# Patient Record
Sex: FEMALE | Race: ASIAN | NOT HISPANIC OR LATINO
[De-identification: names, ages, dates, MRNs, and addresses within clinical notes are randomized per-mention and may not be internally consistent; named-entity substitution may affect disease eponyms.]

---

## 2017-10-16 ENCOUNTER — RESULT REVIEW (OUTPATIENT)
Age: 32
End: 2017-10-16

## 2017-11-09 ENCOUNTER — RECORD ABSTRACTING (OUTPATIENT)
Age: 32
End: 2017-11-09

## 2017-11-09 DIAGNOSIS — Z80.3 FAMILY HISTORY OF MALIGNANT NEOPLASM OF BREAST: ICD-10-CM

## 2017-11-09 DIAGNOSIS — Z82.49 FAMILY HISTORY OF ISCHEMIC HEART DISEASE AND OTHER DISEASES OF THE CIRCULATORY SYSTEM: ICD-10-CM

## 2017-11-09 DIAGNOSIS — Z83.3 FAMILY HISTORY OF DIABETES MELLITUS: ICD-10-CM

## 2017-11-09 DIAGNOSIS — O09.90 SUPERVISION OF HIGH RISK PREGNANCY, UNSPECIFIED, UNSPECIFIED TRIMESTER: ICD-10-CM

## 2017-11-09 PROBLEM — Z00.00 ENCOUNTER FOR PREVENTIVE HEALTH EXAMINATION: Status: ACTIVE | Noted: 2017-11-09

## 2017-11-14 ENCOUNTER — ASOB RESULT (OUTPATIENT)
Age: 32
End: 2017-11-14

## 2017-11-14 ENCOUNTER — APPOINTMENT (OUTPATIENT)
Dept: ANTEPARTUM | Facility: CLINIC | Age: 32
End: 2017-11-14
Payer: COMMERCIAL

## 2017-11-14 ENCOUNTER — APPOINTMENT (OUTPATIENT)
Dept: MATERNAL FETAL MEDICINE | Facility: CLINIC | Age: 32
End: 2017-11-14
Payer: COMMERCIAL

## 2017-11-14 VITALS
SYSTOLIC BLOOD PRESSURE: 160 MMHG | DIASTOLIC BLOOD PRESSURE: 90 MMHG | HEIGHT: 68 IN | BODY MASS INDEX: 37.44 KG/M2 | WEIGHT: 247 LBS

## 2017-11-14 PROCEDURE — 97802 MEDICAL NUTRITION INDIV IN: CPT

## 2017-11-14 PROCEDURE — 76813 OB US NUCHAL MEAS 1 GEST: CPT

## 2017-11-14 PROCEDURE — 99243 OFF/OP CNSLTJ NEW/EST LOW 30: CPT | Mod: 25

## 2017-11-14 PROCEDURE — 76801 OB US < 14 WKS SINGLE FETUS: CPT

## 2017-11-14 PROCEDURE — 93975 VASCULAR STUDY: CPT

## 2017-11-14 RX ORDER — LABETALOL HYDROCHLORIDE 100 MG/1
100 TABLET, FILM COATED ORAL
Qty: 60 | Refills: 4 | Status: ACTIVE | COMMUNITY
Start: 2017-11-14 | End: 1900-01-01

## 2017-11-15 LAB
BILIRUB UR QL STRIP: NEGATIVE
COLLECTION METHOD: NORMAL
GLUCOSE UR-MCNC: NEGATIVE
HCG UR QL: 0.2 EU/DL
HGB UR QL STRIP.AUTO: NORMAL
KETONES UR-MCNC: NORMAL
LEUKOCYTE ESTERASE UR QL STRIP: NEGATIVE
NITRITE UR QL STRIP: NEGATIVE
PH UR STRIP: 6
PROT UR STRIP-MCNC: NEGATIVE
SP GR UR STRIP: 1.02

## 2017-11-20 LAB
ALBUMIN SERPL ELPH-MCNC: 3.7 G/DL
ALP BLD-CCNC: 60 U/L
ALT SERPL-CCNC: 19 U/L
ANION GAP SERPL CALC-SCNC: 15 MMOL/L
AST SERPL-CCNC: 14 U/L
BASOPHILS # BLD AUTO: 0.02 K/UL
BASOPHILS NFR BLD AUTO: 0.1 %
BILIRUB SERPL-MCNC: 0.2 MG/DL
BUN SERPL-MCNC: 5 MG/DL
CALCIUM SERPL-MCNC: 9.1 MG/DL
CHLORIDE SERPL-SCNC: 101 MMOL/L
CO2 SERPL-SCNC: 19 MMOL/L
CREAT SERPL-MCNC: 0.63 MG/DL
CREAT SPEC-SCNC: 137 MG/DL
CREAT/PROT UR: 0.1 RATIO
EOSINOPHIL # BLD AUTO: 0.08 K/UL
EOSINOPHIL NFR BLD AUTO: 0.5 %
GLUCOSE 1H P 50 G GLC PO SERPL-MCNC: 152 MG/DL
GLUCOSE SERPL-MCNC: 143 MG/DL
HCT VFR BLD CALC: 37.8 %
HGB BLD-MCNC: 12.5 G/DL
IMM GRANULOCYTES NFR BLD AUTO: 0.3 %
LYMPHOCYTES # BLD AUTO: 2.34 K/UL
LYMPHOCYTES NFR BLD AUTO: 15.9 %
MAN DIFF?: NORMAL
MCHC RBC-ENTMCNC: 28.3 PG
MCHC RBC-ENTMCNC: 33.1 GM/DL
MCV RBC AUTO: 85.7 FL
MONOCYTES # BLD AUTO: 0.5 K/UL
MONOCYTES NFR BLD AUTO: 3.4 %
NEUTROPHILS # BLD AUTO: 11.77 K/UL
NEUTROPHILS NFR BLD AUTO: 79.8 %
PLATELET # BLD AUTO: 289 K/UL
POTASSIUM SERPL-SCNC: 3.8 MMOL/L
PROT SERPL-MCNC: 7 G/DL
PROT UR-MCNC: 13 MG/DL
RBC # BLD: 4.41 M/UL
RBC # FLD: 13.6 %
SODIUM SERPL-SCNC: 135 MMOL/L
WBC # FLD AUTO: 14.76 K/UL

## 2018-01-10 ENCOUNTER — ASOB RESULT (OUTPATIENT)
Age: 33
End: 2018-01-10

## 2018-01-10 ENCOUNTER — APPOINTMENT (OUTPATIENT)
Dept: ANTEPARTUM | Facility: CLINIC | Age: 33
End: 2018-01-10
Payer: COMMERCIAL

## 2018-01-10 PROCEDURE — 76811 OB US DETAILED SNGL FETUS: CPT

## 2018-01-11 ENCOUNTER — APPOINTMENT (OUTPATIENT)
Dept: ANTEPARTUM | Facility: CLINIC | Age: 33
End: 2018-01-11

## 2018-02-05 ENCOUNTER — OUTPATIENT (OUTPATIENT)
Dept: OUTPATIENT SERVICES | Facility: HOSPITAL | Age: 33
LOS: 1 days | End: 2018-02-05
Payer: COMMERCIAL

## 2018-02-05 DIAGNOSIS — Z3A.00 WEEKS OF GESTATION OF PREGNANCY NOT SPECIFIED: ICD-10-CM

## 2018-02-05 DIAGNOSIS — O26.899 OTHER SPECIFIED PREGNANCY RELATED CONDITIONS, UNSPECIFIED TRIMESTER: ICD-10-CM

## 2018-02-05 LAB
ALBUMIN SERPL ELPH-MCNC: 3.7 G/DL — SIGNIFICANT CHANGE UP (ref 3.3–5)
ALP SERPL-CCNC: 68 U/L — SIGNIFICANT CHANGE UP (ref 40–120)
ALT FLD-CCNC: 11 U/L RC — SIGNIFICANT CHANGE UP (ref 10–45)
ANION GAP SERPL CALC-SCNC: 17 MMOL/L — SIGNIFICANT CHANGE UP (ref 5–17)
APPEARANCE UR: CLEAR — SIGNIFICANT CHANGE UP
APTT BLD: 26.5 SEC — LOW (ref 27.5–37.4)
AST SERPL-CCNC: 11 U/L — SIGNIFICANT CHANGE UP (ref 10–40)
BASOPHILS # BLD AUTO: 0.1 K/UL — SIGNIFICANT CHANGE UP (ref 0–0.2)
BASOPHILS NFR BLD AUTO: 0.4 % — SIGNIFICANT CHANGE UP (ref 0–2)
BILIRUB SERPL-MCNC: 0.2 MG/DL — SIGNIFICANT CHANGE UP (ref 0.2–1.2)
BILIRUB UR-MCNC: NEGATIVE — SIGNIFICANT CHANGE UP
BUN SERPL-MCNC: 7 MG/DL — SIGNIFICANT CHANGE UP (ref 7–23)
CALCIUM SERPL-MCNC: 9.7 MG/DL — SIGNIFICANT CHANGE UP (ref 8.4–10.5)
CHLORIDE SERPL-SCNC: 99 MMOL/L — SIGNIFICANT CHANGE UP (ref 96–108)
CO2 SERPL-SCNC: 22 MMOL/L — SIGNIFICANT CHANGE UP (ref 22–31)
COLOR SPEC: SIGNIFICANT CHANGE UP
CREAT SERPL-MCNC: 0.54 MG/DL — SIGNIFICANT CHANGE UP (ref 0.5–1.3)
DIFF PNL FLD: ABNORMAL
EOSINOPHIL # BLD AUTO: 0 K/UL — SIGNIFICANT CHANGE UP (ref 0–0.5)
EOSINOPHIL NFR BLD AUTO: 0.2 % — SIGNIFICANT CHANGE UP (ref 0–6)
FIBRINOGEN PPP-MCNC: 669 MG/DL — HIGH (ref 310–510)
GLUCOSE SERPL-MCNC: 88 MG/DL — SIGNIFICANT CHANGE UP (ref 70–99)
GLUCOSE UR QL: NEGATIVE — SIGNIFICANT CHANGE UP
HCT VFR BLD CALC: 33 % — LOW (ref 34.5–45)
HGB BLD-MCNC: 11.6 G/DL — SIGNIFICANT CHANGE UP (ref 11.5–15.5)
INR BLD: 1.05 RATIO — SIGNIFICANT CHANGE UP (ref 0.88–1.16)
KETONES UR-MCNC: NEGATIVE — SIGNIFICANT CHANGE UP
LDH SERPL L TO P-CCNC: 134 U/L — SIGNIFICANT CHANGE UP (ref 50–242)
LEUKOCYTE ESTERASE UR-ACNC: NEGATIVE — SIGNIFICANT CHANGE UP
LYMPHOCYTES # BLD AUTO: 16.7 % — SIGNIFICANT CHANGE UP (ref 13–44)
LYMPHOCYTES # BLD AUTO: 2.7 K/UL — SIGNIFICANT CHANGE UP (ref 1–3.3)
MCHC RBC-ENTMCNC: 30.4 PG — SIGNIFICANT CHANGE UP (ref 27–34)
MCHC RBC-ENTMCNC: 35.2 GM/DL — SIGNIFICANT CHANGE UP (ref 32–36)
MCV RBC AUTO: 86.4 FL — SIGNIFICANT CHANGE UP (ref 80–100)
MONOCYTES # BLD AUTO: 0.9 K/UL — SIGNIFICANT CHANGE UP (ref 0–0.9)
MONOCYTES NFR BLD AUTO: 5.4 % — SIGNIFICANT CHANGE UP (ref 2–14)
NEUTROPHILS # BLD AUTO: 12.6 K/UL — HIGH (ref 1.8–7.4)
NEUTROPHILS NFR BLD AUTO: 77.3 % — HIGH (ref 43–77)
NITRITE UR-MCNC: NEGATIVE — SIGNIFICANT CHANGE UP
PH UR: 6.5 — SIGNIFICANT CHANGE UP (ref 5–8)
PLATELET # BLD AUTO: 269 K/UL — SIGNIFICANT CHANGE UP (ref 150–400)
POTASSIUM SERPL-MCNC: 3.6 MMOL/L — SIGNIFICANT CHANGE UP (ref 3.5–5.3)
POTASSIUM SERPL-SCNC: 3.6 MMOL/L — SIGNIFICANT CHANGE UP (ref 3.5–5.3)
PROT SERPL-MCNC: 7.3 G/DL — SIGNIFICANT CHANGE UP (ref 6–8.3)
PROT UR-MCNC: NEGATIVE — SIGNIFICANT CHANGE UP
PROTHROM AB SERPL-ACNC: 11.4 SEC — SIGNIFICANT CHANGE UP (ref 9.8–12.7)
RBC # BLD: 3.82 M/UL — SIGNIFICANT CHANGE UP (ref 3.8–5.2)
RBC # FLD: 12.3 % — SIGNIFICANT CHANGE UP (ref 10.3–14.5)
SODIUM SERPL-SCNC: 138 MMOL/L — SIGNIFICANT CHANGE UP (ref 135–145)
SP GR SPEC: 1.01 — LOW (ref 1.01–1.02)
URATE SERPL-MCNC: 3.7 MG/DL — SIGNIFICANT CHANGE UP (ref 2.5–7)
UROBILINOGEN FLD QL: NEGATIVE — SIGNIFICANT CHANGE UP
WBC # BLD: 16.4 K/UL — HIGH (ref 3.8–10.5)
WBC # FLD AUTO: 16.4 K/UL — HIGH (ref 3.8–10.5)

## 2018-02-05 PROCEDURE — 84550 ASSAY OF BLOOD/URIC ACID: CPT

## 2018-02-05 PROCEDURE — 80053 COMPREHEN METABOLIC PANEL: CPT

## 2018-02-05 PROCEDURE — 59025 FETAL NON-STRESS TEST: CPT

## 2018-02-05 PROCEDURE — 85610 PROTHROMBIN TIME: CPT

## 2018-02-05 PROCEDURE — G0463: CPT

## 2018-02-05 PROCEDURE — 84156 ASSAY OF PROTEIN URINE: CPT

## 2018-02-05 PROCEDURE — 85730 THROMBOPLASTIN TIME PARTIAL: CPT

## 2018-02-05 PROCEDURE — 83615 LACTATE (LD) (LDH) ENZYME: CPT

## 2018-02-05 PROCEDURE — 85027 COMPLETE CBC AUTOMATED: CPT

## 2018-02-05 PROCEDURE — 81001 URINALYSIS AUTO W/SCOPE: CPT

## 2018-02-05 PROCEDURE — 85384 FIBRINOGEN ACTIVITY: CPT

## 2018-02-05 RX ORDER — LABETALOL HCL 100 MG
100 TABLET ORAL ONCE
Qty: 0 | Refills: 0 | Status: COMPLETED | OUTPATIENT
Start: 2018-02-05 | End: 2018-02-05

## 2018-02-05 RX ADMIN — Medication 100 MILLIGRAM(S): at 19:55

## 2018-02-06 LAB
CREAT ?TM UR-MCNC: 47 MG/DL — SIGNIFICANT CHANGE UP
PROT ?TM UR-MCNC: 9 MG/DL — SIGNIFICANT CHANGE UP (ref 0–12)
PROT/CREAT UR-RTO: 0.2 RATIO — SIGNIFICANT CHANGE UP (ref 0–0.2)

## 2018-03-27 ENCOUNTER — ASOB RESULT (OUTPATIENT)
Age: 33
End: 2018-03-27

## 2018-03-27 ENCOUNTER — APPOINTMENT (OUTPATIENT)
Dept: MATERNAL FETAL MEDICINE | Facility: CLINIC | Age: 33
End: 2018-03-27
Payer: COMMERCIAL

## 2018-03-27 DIAGNOSIS — O99.810 ABNORMAL GLUCOSE COMPLICATING PREGNANCY: ICD-10-CM

## 2018-03-27 PROCEDURE — G0109 DIAB MANAGE TRN IND/GROUP: CPT

## 2018-03-28 ENCOUNTER — APPOINTMENT (OUTPATIENT)
Dept: MATERNAL FETAL MEDICINE | Facility: CLINIC | Age: 33
End: 2018-03-28
Payer: COMMERCIAL

## 2018-03-28 PROCEDURE — G0109 DIAB MANAGE TRN IND/GROUP: CPT

## 2018-04-13 ENCOUNTER — MESSAGE (OUTPATIENT)
Age: 33
End: 2018-04-13

## 2018-04-16 ENCOUNTER — APPOINTMENT (OUTPATIENT)
Dept: MATERNAL FETAL MEDICINE | Facility: CLINIC | Age: 33
End: 2018-04-16
Payer: COMMERCIAL

## 2018-04-16 ENCOUNTER — ASOB RESULT (OUTPATIENT)
Age: 33
End: 2018-04-16

## 2018-04-16 VITALS — WEIGHT: 264.25 LBS | BODY MASS INDEX: 40.18 KG/M2

## 2018-04-16 PROCEDURE — G0108 DIAB MANAGE TRN  PER INDIV: CPT

## 2018-04-17 ENCOUNTER — OTHER (OUTPATIENT)
Age: 33
End: 2018-04-17

## 2018-04-17 RX ORDER — SYRING-NEEDL,DISP,INSUL,0.3 ML 31 GX5/16"
31G X 5/16" SYRINGE, EMPTY DISPOSABLE MISCELLANEOUS
Qty: 1 | Refills: 1 | Status: COMPLETED | COMMUNITY
Start: 2018-04-17 | End: 2019-04-17

## 2018-04-17 RX ORDER — HUMAN INSULIN 100 [IU]/ML
100 INJECTION, SUSPENSION SUBCUTANEOUS
Qty: 1 | Refills: 2 | Status: COMPLETED | COMMUNITY
Start: 2018-04-17 | End: 2019-04-17

## 2018-04-19 ENCOUNTER — OUTPATIENT (OUTPATIENT)
Dept: OUTPATIENT SERVICES | Facility: HOSPITAL | Age: 33
LOS: 1 days | End: 2018-04-19
Payer: COMMERCIAL

## 2018-04-19 DIAGNOSIS — Z3A.00 WEEKS OF GESTATION OF PREGNANCY NOT SPECIFIED: ICD-10-CM

## 2018-04-19 DIAGNOSIS — O26.899 OTHER SPECIFIED PREGNANCY RELATED CONDITIONS, UNSPECIFIED TRIMESTER: ICD-10-CM

## 2018-04-19 LAB
ALBUMIN SERPL ELPH-MCNC: 3.7 G/DL — SIGNIFICANT CHANGE UP (ref 3.3–5)
ALP SERPL-CCNC: 93 U/L — SIGNIFICANT CHANGE UP (ref 40–120)
ALT FLD-CCNC: 17 U/L — SIGNIFICANT CHANGE UP (ref 10–45)
ANION GAP SERPL CALC-SCNC: 11 MMOL/L — SIGNIFICANT CHANGE UP (ref 5–17)
APPEARANCE UR: CLEAR — SIGNIFICANT CHANGE UP
APTT BLD: 26.6 SEC — LOW (ref 27.5–37.4)
AST SERPL-CCNC: 10 U/L — SIGNIFICANT CHANGE UP (ref 10–40)
BASOPHILS # BLD AUTO: 0 K/UL — SIGNIFICANT CHANGE UP (ref 0–0.2)
BASOPHILS NFR BLD AUTO: 0.1 % — SIGNIFICANT CHANGE UP (ref 0–2)
BILIRUB SERPL-MCNC: 0.1 MG/DL — LOW (ref 0.2–1.2)
BILIRUB UR-MCNC: NEGATIVE — SIGNIFICANT CHANGE UP
BUN SERPL-MCNC: 7 MG/DL — SIGNIFICANT CHANGE UP (ref 7–23)
CALCIUM SERPL-MCNC: 9.3 MG/DL — SIGNIFICANT CHANGE UP (ref 8.4–10.5)
CHLORIDE SERPL-SCNC: 103 MMOL/L — SIGNIFICANT CHANGE UP (ref 96–108)
CO2 SERPL-SCNC: 24 MMOL/L — SIGNIFICANT CHANGE UP (ref 22–31)
COLOR SPEC: SIGNIFICANT CHANGE UP
CREAT SERPL-MCNC: 0.48 MG/DL — LOW (ref 0.5–1.3)
DIFF PNL FLD: NEGATIVE — SIGNIFICANT CHANGE UP
EOSINOPHIL # BLD AUTO: 0.1 K/UL — SIGNIFICANT CHANGE UP (ref 0–0.5)
EOSINOPHIL NFR BLD AUTO: 0.5 % — SIGNIFICANT CHANGE UP (ref 0–6)
FIBRINOGEN PPP-MCNC: 692 MG/DL — HIGH (ref 310–510)
GLUCOSE SERPL-MCNC: 104 MG/DL — HIGH (ref 70–99)
GLUCOSE UR QL: NEGATIVE — SIGNIFICANT CHANGE UP
HCT VFR BLD CALC: 36.7 % — SIGNIFICANT CHANGE UP (ref 34.5–45)
HGB BLD-MCNC: 12.2 G/DL — SIGNIFICANT CHANGE UP (ref 11.5–15.5)
INR BLD: 1 RATIO — SIGNIFICANT CHANGE UP (ref 0.88–1.16)
KETONES UR-MCNC: NEGATIVE — SIGNIFICANT CHANGE UP
LDH SERPL L TO P-CCNC: 138 U/L — SIGNIFICANT CHANGE UP (ref 50–242)
LEUKOCYTE ESTERASE UR-ACNC: NEGATIVE — SIGNIFICANT CHANGE UP
LYMPHOCYTES # BLD AUTO: 19.5 % — SIGNIFICANT CHANGE UP (ref 13–44)
LYMPHOCYTES # BLD AUTO: 2.2 K/UL — SIGNIFICANT CHANGE UP (ref 1–3.3)
MCHC RBC-ENTMCNC: 28.7 PG — SIGNIFICANT CHANGE UP (ref 27–34)
MCHC RBC-ENTMCNC: 33.3 GM/DL — SIGNIFICANT CHANGE UP (ref 32–36)
MCV RBC AUTO: 86 FL — SIGNIFICANT CHANGE UP (ref 80–100)
MONOCYTES # BLD AUTO: 0.9 K/UL — SIGNIFICANT CHANGE UP (ref 0–0.9)
MONOCYTES NFR BLD AUTO: 7.5 % — SIGNIFICANT CHANGE UP (ref 2–14)
NEUTROPHILS # BLD AUTO: 8.3 K/UL — HIGH (ref 1.8–7.4)
NEUTROPHILS NFR BLD AUTO: 72.4 % — SIGNIFICANT CHANGE UP (ref 43–77)
NITRITE UR-MCNC: NEGATIVE — SIGNIFICANT CHANGE UP
PH UR: 6.5 — SIGNIFICANT CHANGE UP (ref 5–8)
PLATELET # BLD AUTO: 250 K/UL — SIGNIFICANT CHANGE UP (ref 150–400)
POTASSIUM SERPL-MCNC: 3.8 MMOL/L — SIGNIFICANT CHANGE UP (ref 3.5–5.3)
POTASSIUM SERPL-SCNC: 3.8 MMOL/L — SIGNIFICANT CHANGE UP (ref 3.5–5.3)
PROT SERPL-MCNC: 7.1 G/DL — SIGNIFICANT CHANGE UP (ref 6–8.3)
PROT UR-MCNC: NEGATIVE — SIGNIFICANT CHANGE UP
PROTHROM AB SERPL-ACNC: 10.8 SEC — SIGNIFICANT CHANGE UP (ref 9.8–12.7)
RBC # BLD: 4.26 M/UL — SIGNIFICANT CHANGE UP (ref 3.8–5.2)
RBC # FLD: 13.3 % — SIGNIFICANT CHANGE UP (ref 10.3–14.5)
SODIUM SERPL-SCNC: 138 MMOL/L — SIGNIFICANT CHANGE UP (ref 135–145)
SP GR SPEC: <1.005 — LOW (ref 1.01–1.02)
URATE SERPL-MCNC: 2.6 MG/DL — SIGNIFICANT CHANGE UP (ref 2.5–7)
UROBILINOGEN FLD QL: NEGATIVE — SIGNIFICANT CHANGE UP
WBC # BLD: 11.5 K/UL — HIGH (ref 3.8–10.5)
WBC # FLD AUTO: 11.5 K/UL — HIGH (ref 3.8–10.5)

## 2018-04-19 PROCEDURE — 84550 ASSAY OF BLOOD/URIC ACID: CPT

## 2018-04-19 PROCEDURE — 81003 URINALYSIS AUTO W/O SCOPE: CPT

## 2018-04-19 PROCEDURE — 59025 FETAL NON-STRESS TEST: CPT

## 2018-04-19 PROCEDURE — 85027 COMPLETE CBC AUTOMATED: CPT

## 2018-04-19 PROCEDURE — 84156 ASSAY OF PROTEIN URINE: CPT

## 2018-04-19 PROCEDURE — 85610 PROTHROMBIN TIME: CPT

## 2018-04-19 PROCEDURE — 80053 COMPREHEN METABOLIC PANEL: CPT

## 2018-04-19 PROCEDURE — 85730 THROMBOPLASTIN TIME PARTIAL: CPT

## 2018-04-19 PROCEDURE — G0463: CPT

## 2018-04-19 PROCEDURE — 83615 LACTATE (LD) (LDH) ENZYME: CPT

## 2018-04-19 PROCEDURE — 85384 FIBRINOGEN ACTIVITY: CPT

## 2018-04-19 PROCEDURE — 59025 FETAL NON-STRESS TEST: CPT | Mod: 26

## 2018-04-20 LAB
CREAT ?TM UR-MCNC: 19 MG/DL — SIGNIFICANT CHANGE UP
PROT ?TM UR-MCNC: 4 MG/DL — SIGNIFICANT CHANGE UP (ref 0–12)
PROT/CREAT UR-RTO: 0.2 RATIO — SIGNIFICANT CHANGE UP (ref 0–0.2)

## 2018-04-27 ENCOUNTER — APPOINTMENT (OUTPATIENT)
Dept: MATERNAL FETAL MEDICINE | Facility: CLINIC | Age: 33
End: 2018-04-27
Payer: COMMERCIAL

## 2018-04-27 ENCOUNTER — ASOB RESULT (OUTPATIENT)
Age: 33
End: 2018-04-27

## 2018-04-27 VITALS — SYSTOLIC BLOOD PRESSURE: 160 MMHG | DIASTOLIC BLOOD PRESSURE: 80 MMHG

## 2018-04-27 PROCEDURE — G0108 DIAB MANAGE TRN  PER INDIV: CPT

## 2018-04-30 ENCOUNTER — OUTPATIENT (OUTPATIENT)
Dept: OUTPATIENT SERVICES | Facility: HOSPITAL | Age: 33
LOS: 1 days | End: 2018-04-30
Payer: COMMERCIAL

## 2018-04-30 DIAGNOSIS — Z3A.00 WEEKS OF GESTATION OF PREGNANCY NOT SPECIFIED: ICD-10-CM

## 2018-04-30 DIAGNOSIS — O26.899 OTHER SPECIFIED PREGNANCY RELATED CONDITIONS, UNSPECIFIED TRIMESTER: ICD-10-CM

## 2018-04-30 LAB
ALBUMIN SERPL ELPH-MCNC: 3.4 G/DL — SIGNIFICANT CHANGE UP (ref 3.3–5)
ALP SERPL-CCNC: 96 U/L — SIGNIFICANT CHANGE UP (ref 40–120)
ALT FLD-CCNC: 11 U/L — SIGNIFICANT CHANGE UP (ref 10–45)
ANION GAP SERPL CALC-SCNC: 16 MMOL/L — SIGNIFICANT CHANGE UP (ref 5–17)
APPEARANCE UR: CLEAR — SIGNIFICANT CHANGE UP
APTT BLD: 24.9 SEC — LOW (ref 27.5–37.4)
AST SERPL-CCNC: 13 U/L — SIGNIFICANT CHANGE UP (ref 10–40)
BASOPHILS # BLD AUTO: 0 K/UL — SIGNIFICANT CHANGE UP (ref 0–0.2)
BASOPHILS NFR BLD AUTO: 0.2 % — SIGNIFICANT CHANGE UP (ref 0–2)
BILIRUB SERPL-MCNC: 0.2 MG/DL — SIGNIFICANT CHANGE UP (ref 0.2–1.2)
BILIRUB UR-MCNC: NEGATIVE — SIGNIFICANT CHANGE UP
BUN SERPL-MCNC: 7 MG/DL — SIGNIFICANT CHANGE UP (ref 7–23)
CALCIUM SERPL-MCNC: 9.2 MG/DL — SIGNIFICANT CHANGE UP (ref 8.4–10.5)
CHLORIDE SERPL-SCNC: 101 MMOL/L — SIGNIFICANT CHANGE UP (ref 96–108)
CO2 SERPL-SCNC: 19 MMOL/L — LOW (ref 22–31)
COLOR SPEC: YELLOW — SIGNIFICANT CHANGE UP
CREAT SERPL-MCNC: 0.47 MG/DL — LOW (ref 0.5–1.3)
DIFF PNL FLD: NEGATIVE — SIGNIFICANT CHANGE UP
EOSINOPHIL # BLD AUTO: 0.1 K/UL — SIGNIFICANT CHANGE UP (ref 0–0.5)
EOSINOPHIL NFR BLD AUTO: 0.5 % — SIGNIFICANT CHANGE UP (ref 0–6)
FIBRINOGEN PPP-MCNC: 673 MG/DL — HIGH (ref 310–510)
GLUCOSE SERPL-MCNC: 94 MG/DL — SIGNIFICANT CHANGE UP (ref 70–99)
GLUCOSE UR QL: NEGATIVE — SIGNIFICANT CHANGE UP
HCT VFR BLD CALC: 36.4 % — SIGNIFICANT CHANGE UP (ref 34.5–45)
HGB BLD-MCNC: 12.5 G/DL — SIGNIFICANT CHANGE UP (ref 11.5–15.5)
INR BLD: 0.98 RATIO — SIGNIFICANT CHANGE UP (ref 0.88–1.16)
KETONES UR-MCNC: NEGATIVE — SIGNIFICANT CHANGE UP
LDH SERPL L TO P-CCNC: 136 U/L — SIGNIFICANT CHANGE UP (ref 50–242)
LEUKOCYTE ESTERASE UR-ACNC: ABNORMAL
LYMPHOCYTES # BLD AUTO: 17.7 % — SIGNIFICANT CHANGE UP (ref 13–44)
LYMPHOCYTES # BLD AUTO: 2.4 K/UL — SIGNIFICANT CHANGE UP (ref 1–3.3)
MCHC RBC-ENTMCNC: 29.2 PG — SIGNIFICANT CHANGE UP (ref 27–34)
MCHC RBC-ENTMCNC: 34.4 GM/DL — SIGNIFICANT CHANGE UP (ref 32–36)
MCV RBC AUTO: 84.7 FL — SIGNIFICANT CHANGE UP (ref 80–100)
MONOCYTES # BLD AUTO: 0.9 K/UL — SIGNIFICANT CHANGE UP (ref 0–0.9)
MONOCYTES NFR BLD AUTO: 6.7 % — SIGNIFICANT CHANGE UP (ref 2–14)
NEUTROPHILS # BLD AUTO: 10.2 K/UL — HIGH (ref 1.8–7.4)
NEUTROPHILS NFR BLD AUTO: 74.9 % — SIGNIFICANT CHANGE UP (ref 43–77)
NITRITE UR-MCNC: NEGATIVE — SIGNIFICANT CHANGE UP
PH UR: 6.5 — SIGNIFICANT CHANGE UP (ref 5–8)
PLATELET # BLD AUTO: 231 K/UL — SIGNIFICANT CHANGE UP (ref 150–400)
POTASSIUM SERPL-MCNC: 3.7 MMOL/L — SIGNIFICANT CHANGE UP (ref 3.5–5.3)
POTASSIUM SERPL-SCNC: 3.7 MMOL/L — SIGNIFICANT CHANGE UP (ref 3.5–5.3)
PROT SERPL-MCNC: 6.8 G/DL — SIGNIFICANT CHANGE UP (ref 6–8.3)
PROT UR-MCNC: SIGNIFICANT CHANGE UP
PROTHROM AB SERPL-ACNC: 10.7 SEC — SIGNIFICANT CHANGE UP (ref 9.8–12.7)
RBC # BLD: 4.29 M/UL — SIGNIFICANT CHANGE UP (ref 3.8–5.2)
RBC # FLD: 13.2 % — SIGNIFICANT CHANGE UP (ref 10.3–14.5)
SODIUM SERPL-SCNC: 136 MMOL/L — SIGNIFICANT CHANGE UP (ref 135–145)
SP GR SPEC: 1.01 — SIGNIFICANT CHANGE UP (ref 1.01–1.02)
URATE SERPL-MCNC: 2.8 MG/DL — SIGNIFICANT CHANGE UP (ref 2.5–7)
UROBILINOGEN FLD QL: NEGATIVE — SIGNIFICANT CHANGE UP
WBC # BLD: 13.6 K/UL — HIGH (ref 3.8–10.5)
WBC # FLD AUTO: 13.6 K/UL — HIGH (ref 3.8–10.5)

## 2018-04-30 PROCEDURE — 85027 COMPLETE CBC AUTOMATED: CPT

## 2018-04-30 PROCEDURE — 85610 PROTHROMBIN TIME: CPT

## 2018-04-30 PROCEDURE — 80053 COMPREHEN METABOLIC PANEL: CPT

## 2018-04-30 PROCEDURE — 83615 LACTATE (LD) (LDH) ENZYME: CPT

## 2018-04-30 PROCEDURE — 85384 FIBRINOGEN ACTIVITY: CPT

## 2018-04-30 PROCEDURE — 59025 FETAL NON-STRESS TEST: CPT

## 2018-04-30 PROCEDURE — 84156 ASSAY OF PROTEIN URINE: CPT

## 2018-04-30 PROCEDURE — G0463: CPT

## 2018-04-30 PROCEDURE — 85730 THROMBOPLASTIN TIME PARTIAL: CPT

## 2018-04-30 PROCEDURE — 81001 URINALYSIS AUTO W/SCOPE: CPT

## 2018-04-30 PROCEDURE — 84550 ASSAY OF BLOOD/URIC ACID: CPT

## 2018-05-01 LAB
CREAT ?TM UR-MCNC: 68 MG/DL — SIGNIFICANT CHANGE UP
PROT ?TM UR-MCNC: 13 MG/DL — HIGH (ref 0–12)
PROT/CREAT UR-RTO: 0.2 RATIO — SIGNIFICANT CHANGE UP (ref 0–0.2)

## 2018-05-07 ENCOUNTER — OUTPATIENT (OUTPATIENT)
Dept: OUTPATIENT SERVICES | Facility: HOSPITAL | Age: 33
LOS: 1 days | End: 2018-05-07
Payer: COMMERCIAL

## 2018-05-07 DIAGNOSIS — O26.899 OTHER SPECIFIED PREGNANCY RELATED CONDITIONS, UNSPECIFIED TRIMESTER: ICD-10-CM

## 2018-05-07 DIAGNOSIS — Z3A.00 WEEKS OF GESTATION OF PREGNANCY NOT SPECIFIED: ICD-10-CM

## 2018-05-07 LAB
ALBUMIN SERPL ELPH-MCNC: 3.4 G/DL — SIGNIFICANT CHANGE UP (ref 3.3–5)
ALP SERPL-CCNC: 100 U/L — SIGNIFICANT CHANGE UP (ref 40–120)
ALT FLD-CCNC: 12 U/L — SIGNIFICANT CHANGE UP (ref 10–45)
ANION GAP SERPL CALC-SCNC: 13 MMOL/L — SIGNIFICANT CHANGE UP (ref 5–17)
APPEARANCE UR: ABNORMAL
APTT BLD: 25.5 SEC — LOW (ref 27.5–37.4)
AST SERPL-CCNC: 14 U/L — SIGNIFICANT CHANGE UP (ref 10–40)
BASOPHILS # BLD AUTO: 0 K/UL — SIGNIFICANT CHANGE UP (ref 0–0.2)
BASOPHILS NFR BLD AUTO: 0.3 % — SIGNIFICANT CHANGE UP (ref 0–2)
BILIRUB SERPL-MCNC: 0.1 MG/DL — LOW (ref 0.2–1.2)
BILIRUB UR-MCNC: NEGATIVE — SIGNIFICANT CHANGE UP
BUN SERPL-MCNC: 8 MG/DL — SIGNIFICANT CHANGE UP (ref 7–23)
CALCIUM SERPL-MCNC: 9 MG/DL — SIGNIFICANT CHANGE UP (ref 8.4–10.5)
CHLORIDE SERPL-SCNC: 103 MMOL/L — SIGNIFICANT CHANGE UP (ref 96–108)
CO2 SERPL-SCNC: 21 MMOL/L — LOW (ref 22–31)
COLOR SPEC: YELLOW — SIGNIFICANT CHANGE UP
CREAT SERPL-MCNC: 0.46 MG/DL — LOW (ref 0.5–1.3)
DIFF PNL FLD: NEGATIVE — SIGNIFICANT CHANGE UP
EOSINOPHIL # BLD AUTO: 0 K/UL — SIGNIFICANT CHANGE UP (ref 0–0.5)
EOSINOPHIL NFR BLD AUTO: 0.3 % — SIGNIFICANT CHANGE UP (ref 0–6)
FIBRINOGEN PPP-MCNC: 645 MG/DL — HIGH (ref 310–510)
GLUCOSE SERPL-MCNC: 102 MG/DL — HIGH (ref 70–99)
GLUCOSE UR QL: NEGATIVE — SIGNIFICANT CHANGE UP
HCT VFR BLD CALC: 36.3 % — SIGNIFICANT CHANGE UP (ref 34.5–45)
HGB BLD-MCNC: 12.3 G/DL — SIGNIFICANT CHANGE UP (ref 11.5–15.5)
INR BLD: 0.96 RATIO — SIGNIFICANT CHANGE UP (ref 0.88–1.16)
KETONES UR-MCNC: ABNORMAL
LDH SERPL L TO P-CCNC: 140 U/L — SIGNIFICANT CHANGE UP (ref 50–242)
LEUKOCYTE ESTERASE UR-ACNC: ABNORMAL
LYMPHOCYTES # BLD AUTO: 18.2 % — SIGNIFICANT CHANGE UP (ref 13–44)
LYMPHOCYTES # BLD AUTO: 2.3 K/UL — SIGNIFICANT CHANGE UP (ref 1–3.3)
MCHC RBC-ENTMCNC: 28.8 PG — SIGNIFICANT CHANGE UP (ref 27–34)
MCHC RBC-ENTMCNC: 33.9 GM/DL — SIGNIFICANT CHANGE UP (ref 32–36)
MCV RBC AUTO: 85 FL — SIGNIFICANT CHANGE UP (ref 80–100)
MONOCYTES # BLD AUTO: 1 K/UL — HIGH (ref 0–0.9)
MONOCYTES NFR BLD AUTO: 7.8 % — SIGNIFICANT CHANGE UP (ref 2–14)
NEUTROPHILS # BLD AUTO: 9.4 K/UL — HIGH (ref 1.8–7.4)
NEUTROPHILS NFR BLD AUTO: 73.4 % — SIGNIFICANT CHANGE UP (ref 43–77)
NITRITE UR-MCNC: NEGATIVE — SIGNIFICANT CHANGE UP
PH UR: 6.5 — SIGNIFICANT CHANGE UP (ref 5–8)
PLATELET # BLD AUTO: 236 K/UL — SIGNIFICANT CHANGE UP (ref 150–400)
POTASSIUM SERPL-MCNC: 3.8 MMOL/L — SIGNIFICANT CHANGE UP (ref 3.5–5.3)
POTASSIUM SERPL-SCNC: 3.8 MMOL/L — SIGNIFICANT CHANGE UP (ref 3.5–5.3)
PROT SERPL-MCNC: 6.9 G/DL — SIGNIFICANT CHANGE UP (ref 6–8.3)
PROT UR-MCNC: 30 MG/DL
PROTHROM AB SERPL-ACNC: 10.4 SEC — SIGNIFICANT CHANGE UP (ref 9.8–12.7)
RBC # BLD: 4.27 M/UL — SIGNIFICANT CHANGE UP (ref 3.8–5.2)
RBC # FLD: 13.6 % — SIGNIFICANT CHANGE UP (ref 10.3–14.5)
SODIUM SERPL-SCNC: 137 MMOL/L — SIGNIFICANT CHANGE UP (ref 135–145)
SP GR SPEC: 1.02 — SIGNIFICANT CHANGE UP (ref 1.01–1.02)
URATE SERPL-MCNC: 3 MG/DL — SIGNIFICANT CHANGE UP (ref 2.5–7)
UROBILINOGEN FLD QL: NEGATIVE — SIGNIFICANT CHANGE UP
WBC # BLD: 12.9 K/UL — HIGH (ref 3.8–10.5)
WBC # FLD AUTO: 12.9 K/UL — HIGH (ref 3.8–10.5)

## 2018-05-07 PROCEDURE — 81001 URINALYSIS AUTO W/SCOPE: CPT

## 2018-05-07 PROCEDURE — 85384 FIBRINOGEN ACTIVITY: CPT

## 2018-05-07 PROCEDURE — 59025 FETAL NON-STRESS TEST: CPT

## 2018-05-07 PROCEDURE — 85610 PROTHROMBIN TIME: CPT

## 2018-05-07 PROCEDURE — 84156 ASSAY OF PROTEIN URINE: CPT

## 2018-05-07 PROCEDURE — 83615 LACTATE (LD) (LDH) ENZYME: CPT

## 2018-05-07 PROCEDURE — G0463: CPT

## 2018-05-07 PROCEDURE — 84550 ASSAY OF BLOOD/URIC ACID: CPT

## 2018-05-07 PROCEDURE — 85730 THROMBOPLASTIN TIME PARTIAL: CPT

## 2018-05-07 PROCEDURE — 80053 COMPREHEN METABOLIC PANEL: CPT

## 2018-05-07 PROCEDURE — 85027 COMPLETE CBC AUTOMATED: CPT

## 2018-05-08 LAB
CREAT ?TM UR-MCNC: 154 MG/DL — SIGNIFICANT CHANGE UP
PROT ?TM UR-MCNC: 18 MG/DL — HIGH (ref 0–12)
PROT/CREAT UR-RTO: 0.1 RATIO — SIGNIFICANT CHANGE UP (ref 0–0.2)

## 2018-05-09 ENCOUNTER — INPATIENT (INPATIENT)
Facility: HOSPITAL | Age: 33
LOS: 2 days | Discharge: HOME | End: 2018-05-12
Attending: OBSTETRICS & GYNECOLOGY | Admitting: OBSTETRICS & GYNECOLOGY
Payer: COMMERCIAL

## 2018-05-09 VITALS — HEIGHT: 68 IN | WEIGHT: 262.35 LBS

## 2018-05-09 DIAGNOSIS — O13.9 GESTATIONAL [PREGNANCY-INDUCED] HYPERTENSION WITHOUT SIGNIFICANT PROTEINURIA, UNSPECIFIED TRIMESTER: ICD-10-CM

## 2018-05-09 LAB
ALBUMIN SERPL ELPH-MCNC: 3.4 G/DL — SIGNIFICANT CHANGE UP (ref 3.3–5)
ALP SERPL-CCNC: 102 U/L — SIGNIFICANT CHANGE UP (ref 40–120)
ALT FLD-CCNC: 12 U/L — SIGNIFICANT CHANGE UP (ref 10–45)
ANION GAP SERPL CALC-SCNC: 14 MMOL/L — SIGNIFICANT CHANGE UP (ref 5–17)
APPEARANCE UR: ABNORMAL
APTT BLD: 25.2 SEC — LOW (ref 27.5–37.4)
AST SERPL-CCNC: 15 U/L — SIGNIFICANT CHANGE UP (ref 10–40)
BASOPHILS # BLD AUTO: 0 K/UL — SIGNIFICANT CHANGE UP (ref 0–0.2)
BASOPHILS NFR BLD AUTO: 0.2 % — SIGNIFICANT CHANGE UP (ref 0–2)
BILIRUB SERPL-MCNC: 0.1 MG/DL — LOW (ref 0.2–1.2)
BILIRUB UR-MCNC: NEGATIVE — SIGNIFICANT CHANGE UP
BLD GP AB SCN SERPL QL: NEGATIVE — SIGNIFICANT CHANGE UP
BUN SERPL-MCNC: 8 MG/DL — SIGNIFICANT CHANGE UP (ref 7–23)
CALCIUM SERPL-MCNC: 9.5 MG/DL — SIGNIFICANT CHANGE UP (ref 8.4–10.5)
CHLORIDE SERPL-SCNC: 102 MMOL/L — SIGNIFICANT CHANGE UP (ref 96–108)
CO2 SERPL-SCNC: 22 MMOL/L — SIGNIFICANT CHANGE UP (ref 22–31)
COLOR SPEC: YELLOW — SIGNIFICANT CHANGE UP
CREAT SERPL-MCNC: 0.51 MG/DL — SIGNIFICANT CHANGE UP (ref 0.5–1.3)
DIFF PNL FLD: ABNORMAL
EOSINOPHIL # BLD AUTO: 0 K/UL — SIGNIFICANT CHANGE UP (ref 0–0.5)
EOSINOPHIL NFR BLD AUTO: 0.4 % — SIGNIFICANT CHANGE UP (ref 0–6)
FIBRINOGEN PPP-MCNC: 688 MG/DL — HIGH (ref 310–510)
GLUCOSE BLDC GLUCOMTR-MCNC: 87 MG/DL — SIGNIFICANT CHANGE UP (ref 70–99)
GLUCOSE SERPL-MCNC: 94 MG/DL — SIGNIFICANT CHANGE UP (ref 70–99)
GLUCOSE UR QL: 50 MG/DL
HCT VFR BLD CALC: 36.8 % — SIGNIFICANT CHANGE UP (ref 34.5–45)
HGB BLD-MCNC: 12.4 G/DL — SIGNIFICANT CHANGE UP (ref 11.5–15.5)
INR BLD: 0.96 RATIO — SIGNIFICANT CHANGE UP (ref 0.88–1.16)
KETONES UR-MCNC: NEGATIVE — SIGNIFICANT CHANGE UP
LDH SERPL L TO P-CCNC: 144 U/L — SIGNIFICANT CHANGE UP (ref 50–242)
LEUKOCYTE ESTERASE UR-ACNC: ABNORMAL
LYMPHOCYTES # BLD AUTO: 19.5 % — SIGNIFICANT CHANGE UP (ref 13–44)
LYMPHOCYTES # BLD AUTO: 2.4 K/UL — SIGNIFICANT CHANGE UP (ref 1–3.3)
MCHC RBC-ENTMCNC: 28.5 PG — SIGNIFICANT CHANGE UP (ref 27–34)
MCHC RBC-ENTMCNC: 33.7 GM/DL — SIGNIFICANT CHANGE UP (ref 32–36)
MCV RBC AUTO: 84.6 FL — SIGNIFICANT CHANGE UP (ref 80–100)
MONOCYTES # BLD AUTO: 1.1 K/UL — HIGH (ref 0–0.9)
MONOCYTES NFR BLD AUTO: 8.6 % — SIGNIFICANT CHANGE UP (ref 2–14)
NEUTROPHILS # BLD AUTO: 9 K/UL — HIGH (ref 1.8–7.4)
NEUTROPHILS NFR BLD AUTO: 71.3 % — SIGNIFICANT CHANGE UP (ref 43–77)
NITRITE UR-MCNC: NEGATIVE — SIGNIFICANT CHANGE UP
PH UR: 6.5 — SIGNIFICANT CHANGE UP (ref 5–8)
PLATELET # BLD AUTO: 229 K/UL — SIGNIFICANT CHANGE UP (ref 150–400)
POTASSIUM SERPL-MCNC: 4 MMOL/L — SIGNIFICANT CHANGE UP (ref 3.5–5.3)
POTASSIUM SERPL-SCNC: 4 MMOL/L — SIGNIFICANT CHANGE UP (ref 3.5–5.3)
PROT SERPL-MCNC: 6.8 G/DL — SIGNIFICANT CHANGE UP (ref 6–8.3)
PROT UR-MCNC: 30 MG/DL
PROTHROM AB SERPL-ACNC: 10.4 SEC — SIGNIFICANT CHANGE UP (ref 9.8–12.7)
RBC # BLD: 4.35 M/UL — SIGNIFICANT CHANGE UP (ref 3.8–5.2)
RBC # FLD: 13.2 % — SIGNIFICANT CHANGE UP (ref 10.3–14.5)
RH IG SCN BLD-IMP: POSITIVE — SIGNIFICANT CHANGE UP
SODIUM SERPL-SCNC: 138 MMOL/L — SIGNIFICANT CHANGE UP (ref 135–145)
SP GR SPEC: 1.02 — SIGNIFICANT CHANGE UP (ref 1.01–1.02)
URATE SERPL-MCNC: 3 MG/DL — SIGNIFICANT CHANGE UP (ref 2.5–7)
UROBILINOGEN FLD QL: 1 MG/DL
WBC # BLD: 12.6 K/UL — HIGH (ref 3.8–10.5)
WBC # FLD AUTO: 12.6 K/UL — HIGH (ref 3.8–10.5)

## 2018-05-09 RX ORDER — SODIUM CHLORIDE 9 MG/ML
1000 INJECTION INTRAMUSCULAR; INTRAVENOUS; SUBCUTANEOUS
Qty: 0 | Refills: 0 | Status: DISCONTINUED | OUTPATIENT
Start: 2018-05-09 | End: 2018-05-10

## 2018-05-09 RX ORDER — OXYTOCIN 10 UNIT/ML
333.33 VIAL (ML) INJECTION
Qty: 20 | Refills: 0 | Status: DISCONTINUED | OUTPATIENT
Start: 2018-05-09 | End: 2018-05-10

## 2018-05-09 RX ORDER — LABETALOL HCL 100 MG
200 TABLET ORAL EVERY 8 HOURS
Qty: 0 | Refills: 0 | Status: DISCONTINUED | OUTPATIENT
Start: 2018-05-09 | End: 2018-05-12

## 2018-05-09 RX ORDER — SODIUM CHLORIDE 9 MG/ML
1000 INJECTION, SOLUTION INTRAVENOUS
Qty: 0 | Refills: 0 | Status: DISCONTINUED | OUTPATIENT
Start: 2018-05-09 | End: 2018-05-10

## 2018-05-09 RX ORDER — SODIUM CHLORIDE 9 MG/ML
1000 INJECTION INTRAMUSCULAR; INTRAVENOUS; SUBCUTANEOUS ONCE
Qty: 0 | Refills: 0 | Status: COMPLETED | OUTPATIENT
Start: 2018-05-09 | End: 2018-05-09

## 2018-05-09 RX ORDER — CITRIC ACID/SODIUM CITRATE 300-500 MG
15 SOLUTION, ORAL ORAL EVERY 4 HOURS
Qty: 0 | Refills: 0 | Status: DISCONTINUED | OUTPATIENT
Start: 2018-05-09 | End: 2018-05-10

## 2018-05-09 RX ADMIN — SODIUM CHLORIDE 2000 MILLILITER(S): 9 INJECTION INTRAMUSCULAR; INTRAVENOUS; SUBCUTANEOUS at 21:44

## 2018-05-09 RX ADMIN — Medication 200 MILLIGRAM(S): at 23:54

## 2018-05-09 RX ADMIN — SODIUM CHLORIDE 125 MILLILITER(S): 9 INJECTION, SOLUTION INTRAVENOUS at 21:44

## 2018-05-10 LAB
CREAT ?TM UR-MCNC: 138 MG/DL — SIGNIFICANT CHANGE UP
GLUCOSE BLDC GLUCOMTR-MCNC: 102 MG/DL — HIGH (ref 70–99)
GLUCOSE BLDC GLUCOMTR-MCNC: 103 MG/DL — HIGH (ref 70–99)
GLUCOSE BLDC GLUCOMTR-MCNC: 139 MG/DL — HIGH (ref 70–99)
GLUCOSE BLDC GLUCOMTR-MCNC: 85 MG/DL — SIGNIFICANT CHANGE UP (ref 70–99)
GLUCOSE BLDC GLUCOMTR-MCNC: 91 MG/DL — SIGNIFICANT CHANGE UP (ref 70–99)
GLUCOSE BLDC GLUCOMTR-MCNC: 95 MG/DL — SIGNIFICANT CHANGE UP (ref 70–99)
GLUCOSE BLDC GLUCOMTR-MCNC: 99 MG/DL — SIGNIFICANT CHANGE UP (ref 70–99)
PROT ?TM UR-MCNC: 16 MG/DL — HIGH (ref 0–12)
PROT/CREAT UR-RTO: 0.1 RATIO — SIGNIFICANT CHANGE UP (ref 0–0.2)
RH IG SCN BLD-IMP: POSITIVE — SIGNIFICANT CHANGE UP
T PALLIDUM AB TITR SER: NEGATIVE — SIGNIFICANT CHANGE UP

## 2018-05-10 RX ORDER — MAGNESIUM HYDROXIDE 400 MG/1
30 TABLET, CHEWABLE ORAL
Qty: 0 | Refills: 0 | Status: DISCONTINUED | OUTPATIENT
Start: 2018-05-11 | End: 2018-05-12

## 2018-05-10 RX ORDER — OXYTOCIN 10 UNIT/ML
41.67 VIAL (ML) INJECTION
Qty: 20 | Refills: 0 | Status: DISCONTINUED | OUTPATIENT
Start: 2018-05-11 | End: 2018-05-12

## 2018-05-10 RX ORDER — GLYCERIN ADULT
1 SUPPOSITORY, RECTAL RECTAL AT BEDTIME
Qty: 0 | Refills: 0 | Status: DISCONTINUED | OUTPATIENT
Start: 2018-05-11 | End: 2018-05-12

## 2018-05-10 RX ORDER — OXYTOCIN 10 UNIT/ML
4 VIAL (ML) INJECTION
Qty: 30 | Refills: 0 | Status: DISCONTINUED | OUTPATIENT
Start: 2018-05-10 | End: 2018-05-12

## 2018-05-10 RX ORDER — SODIUM CHLORIDE 9 MG/ML
3 INJECTION INTRAMUSCULAR; INTRAVENOUS; SUBCUTANEOUS EVERY 8 HOURS
Qty: 0 | Refills: 0 | Status: DISCONTINUED | OUTPATIENT
Start: 2018-05-11 | End: 2018-05-12

## 2018-05-10 RX ORDER — OXYCODONE HYDROCHLORIDE 5 MG/1
5 TABLET ORAL EVERY 4 HOURS
Qty: 0 | Refills: 0 | Status: DISCONTINUED | OUTPATIENT
Start: 2018-05-11 | End: 2018-05-12

## 2018-05-10 RX ORDER — OXYTOCIN 10 UNIT/ML
41.67 VIAL (ML) INJECTION
Qty: 20 | Refills: 0 | Status: DISCONTINUED | OUTPATIENT
Start: 2018-05-10 | End: 2018-05-10

## 2018-05-10 RX ORDER — OXYCODONE HYDROCHLORIDE 5 MG/1
5 TABLET ORAL
Qty: 0 | Refills: 0 | Status: DISCONTINUED | OUTPATIENT
Start: 2018-05-11 | End: 2018-05-12

## 2018-05-10 RX ORDER — PRAMOXINE HYDROCHLORIDE 150 MG/15G
1 AEROSOL, FOAM RECTAL EVERY 4 HOURS
Qty: 0 | Refills: 0 | Status: DISCONTINUED | OUTPATIENT
Start: 2018-05-11 | End: 2018-05-12

## 2018-05-10 RX ORDER — PRAMOXINE HYDROCHLORIDE 150 MG/15G
1 AEROSOL, FOAM RECTAL EVERY 4 HOURS
Qty: 0 | Refills: 0 | Status: DISCONTINUED | OUTPATIENT
Start: 2018-05-10 | End: 2018-05-10

## 2018-05-10 RX ORDER — AER TRAVELER 0.5 G/1
1 SOLUTION RECTAL; TOPICAL EVERY 4 HOURS
Qty: 0 | Refills: 0 | Status: DISCONTINUED | OUTPATIENT
Start: 2018-05-11 | End: 2018-05-12

## 2018-05-10 RX ORDER — HYDROCORTISONE 1 %
1 OINTMENT (GRAM) TOPICAL EVERY 4 HOURS
Qty: 0 | Refills: 0 | Status: DISCONTINUED | OUTPATIENT
Start: 2018-05-10 | End: 2018-05-10

## 2018-05-10 RX ORDER — TETANUS TOXOID, REDUCED DIPHTHERIA TOXOID AND ACELLULAR PERTUSSIS VACCINE, ADSORBED 5; 2.5; 8; 8; 2.5 [IU]/.5ML; [IU]/.5ML; UG/.5ML; UG/.5ML; UG/.5ML
0.5 SUSPENSION INTRAMUSCULAR ONCE
Qty: 0 | Refills: 0 | Status: DISCONTINUED | OUTPATIENT
Start: 2018-05-11 | End: 2018-05-12

## 2018-05-10 RX ORDER — KETOROLAC TROMETHAMINE 30 MG/ML
30 SYRINGE (ML) INJECTION ONCE
Qty: 0 | Refills: 0 | Status: DISCONTINUED | OUTPATIENT
Start: 2018-05-10 | End: 2018-05-12

## 2018-05-10 RX ORDER — DIBUCAINE 1 %
1 OINTMENT (GRAM) RECTAL EVERY 4 HOURS
Qty: 0 | Refills: 0 | Status: DISCONTINUED | OUTPATIENT
Start: 2018-05-10 | End: 2018-05-10

## 2018-05-10 RX ORDER — DIPHENHYDRAMINE HCL 50 MG
25 CAPSULE ORAL EVERY 6 HOURS
Qty: 0 | Refills: 0 | Status: DISCONTINUED | OUTPATIENT
Start: 2018-05-11 | End: 2018-05-12

## 2018-05-10 RX ORDER — AER TRAVELER 0.5 G/1
1 SOLUTION RECTAL; TOPICAL EVERY 4 HOURS
Qty: 0 | Refills: 0 | Status: DISCONTINUED | OUTPATIENT
Start: 2018-05-10 | End: 2018-05-10

## 2018-05-10 RX ORDER — HYDROCORTISONE 1 %
1 OINTMENT (GRAM) TOPICAL EVERY 4 HOURS
Qty: 0 | Refills: 0 | Status: DISCONTINUED | OUTPATIENT
Start: 2018-05-11 | End: 2018-05-12

## 2018-05-10 RX ORDER — ACETAMINOPHEN 500 MG
975 TABLET ORAL EVERY 6 HOURS
Qty: 0 | Refills: 0 | Status: COMPLETED | OUTPATIENT
Start: 2018-05-11 | End: 2019-04-09

## 2018-05-10 RX ORDER — LANOLIN
1 OINTMENT (GRAM) TOPICAL EVERY 6 HOURS
Qty: 0 | Refills: 0 | Status: DISCONTINUED | OUTPATIENT
Start: 2018-05-11 | End: 2018-05-12

## 2018-05-10 RX ORDER — SODIUM CHLORIDE 9 MG/ML
3 INJECTION INTRAMUSCULAR; INTRAVENOUS; SUBCUTANEOUS EVERY 8 HOURS
Qty: 0 | Refills: 0 | Status: DISCONTINUED | OUTPATIENT
Start: 2018-05-10 | End: 2018-05-10

## 2018-05-10 RX ORDER — SIMETHICONE 80 MG/1
80 TABLET, CHEWABLE ORAL EVERY 6 HOURS
Qty: 0 | Refills: 0 | Status: DISCONTINUED | OUTPATIENT
Start: 2018-05-11 | End: 2018-05-12

## 2018-05-10 RX ORDER — DOCUSATE SODIUM 100 MG
100 CAPSULE ORAL
Qty: 0 | Refills: 0 | Status: DISCONTINUED | OUTPATIENT
Start: 2018-05-11 | End: 2018-05-12

## 2018-05-10 RX ORDER — DIBUCAINE 1 %
1 OINTMENT (GRAM) RECTAL EVERY 4 HOURS
Qty: 0 | Refills: 0 | Status: DISCONTINUED | OUTPATIENT
Start: 2018-05-11 | End: 2018-05-12

## 2018-05-10 RX ORDER — IBUPROFEN 200 MG
600 TABLET ORAL EVERY 6 HOURS
Qty: 0 | Refills: 0 | Status: COMPLETED | OUTPATIENT
Start: 2018-05-11 | End: 2019-04-09

## 2018-05-10 RX ADMIN — Medication 200 MILLIGRAM(S): at 07:45

## 2018-05-10 RX ADMIN — Medication 200 MILLIGRAM(S): at 23:59

## 2018-05-10 RX ADMIN — Medication 125 MILLIUNIT(S)/MIN: at 19:12

## 2018-05-10 RX ADMIN — Medication 200 MILLIGRAM(S): at 16:09

## 2018-05-10 RX ADMIN — Medication 4 MILLIUNIT(S)/MIN: at 00:30

## 2018-05-10 RX ADMIN — SODIUM CHLORIDE 125 MILLILITER(S): 9 INJECTION INTRAMUSCULAR; INTRAVENOUS; SUBCUTANEOUS at 02:53

## 2018-05-11 LAB
HCT VFR BLD CALC: 27.9 % — LOW (ref 34.5–45)
HGB BLD-MCNC: 9.2 G/DL — LOW (ref 11.5–15.5)

## 2018-05-11 RX ORDER — IBUPROFEN 200 MG
600 TABLET ORAL EVERY 6 HOURS
Qty: 0 | Refills: 0 | Status: DISCONTINUED | OUTPATIENT
Start: 2018-05-11 | End: 2018-05-12

## 2018-05-11 RX ORDER — ACETAMINOPHEN 500 MG
975 TABLET ORAL EVERY 6 HOURS
Qty: 0 | Refills: 0 | Status: DISCONTINUED | OUTPATIENT
Start: 2018-05-11 | End: 2018-05-12

## 2018-05-11 RX ADMIN — Medication 600 MILLIGRAM(S): at 23:45

## 2018-05-11 RX ADMIN — Medication 200 MILLIGRAM(S): at 10:03

## 2018-05-11 RX ADMIN — Medication 975 MILLIGRAM(S): at 06:13

## 2018-05-11 RX ADMIN — Medication 600 MILLIGRAM(S): at 06:14

## 2018-05-11 RX ADMIN — Medication 600 MILLIGRAM(S): at 22:30

## 2018-05-11 RX ADMIN — Medication 600 MILLIGRAM(S): at 06:59

## 2018-05-11 RX ADMIN — Medication 975 MILLIGRAM(S): at 23:45

## 2018-05-11 RX ADMIN — Medication 975 MILLIGRAM(S): at 15:20

## 2018-05-11 RX ADMIN — Medication 975 MILLIGRAM(S): at 23:58

## 2018-05-11 RX ADMIN — Medication 975 MILLIGRAM(S): at 14:50

## 2018-05-11 RX ADMIN — Medication 1 TABLET(S): at 16:42

## 2018-05-11 RX ADMIN — Medication 200 MILLIGRAM(S): at 19:03

## 2018-05-11 RX ADMIN — Medication 600 MILLIGRAM(S): at 15:20

## 2018-05-11 RX ADMIN — Medication 975 MILLIGRAM(S): at 06:58

## 2018-05-11 RX ADMIN — Medication 600 MILLIGRAM(S): at 14:51

## 2018-05-11 NOTE — PROGRESS NOTE ADULT - SUBJECTIVE AND OBJECTIVE BOX
Postpartum Note- PPD#1    Allergies    No Known Allergies    Intolerances      Prenatal labs:    Rubella IgG:     Immune             RPR:  Negative                 Blood Type:  A+    S:Patient is a  32y   G1    P 1     PPD#1         S/P   VAVD  Patient w/o complaints, pain is controlled.    Pt is OOB, tolerating PO, passing flatus. Lochia WNL.   Feeding: Breastfeeding    O:  Vital Signs Last 24 Hrs  T(C): 36.7 (11 May 2018 05:00), Max: 37.4 (10 May 2018 19:00)  T(F): 98.1 (11 May 2018 05:00), Max: 99.3 (10 May 2018 19:00)  HR: 87 (11 May 2018 05:00) (85 - 116)  BP: 115/66 (11 May 2018 05:00) (115/66 - 149/65)  BP(mean): --  RR: 18 (11 May 2018 05:00) (14 - 18)  SpO2: 100% (11 May 2018 05:00) (98% - 100%)     Gen: NAD  CV: rrr s1s2, CTABL  Abdomen: Soft, nontender, non-distended, fundus firm.  Lochia: WNL  Perineum: RML episitomy  Ext: Neg edema, Neg calf tenderness.  Pedal pulses palpated B/L    LABS:    Hemoglobin: 12.4 g/dL (05-09 @ 21:55)      Hematocrit: 36.8 % (05-09 @ 21:55)      A/P:  32y  PPD # 1      S/P     VAVD,   doing well    PMHx: GDMA2  Current Issues: cHTN- currently on labetalol 200mg TID    Increase OOB  Regular diet  PO Pain protocol  AM H&H  Routine Postpartum Care

## 2018-05-12 ENCOUNTER — TRANSCRIPTION ENCOUNTER (OUTPATIENT)
Age: 33
End: 2018-05-12

## 2018-05-12 VITALS
DIASTOLIC BLOOD PRESSURE: 82 MMHG | TEMPERATURE: 98 F | RESPIRATION RATE: 18 BRPM | SYSTOLIC BLOOD PRESSURE: 138 MMHG | HEART RATE: 96 BPM

## 2018-05-12 PROCEDURE — 85730 THROMBOPLASTIN TIME PARTIAL: CPT

## 2018-05-12 PROCEDURE — 86901 BLOOD TYPING SEROLOGIC RH(D): CPT

## 2018-05-12 PROCEDURE — 86780 TREPONEMA PALLIDUM: CPT

## 2018-05-12 PROCEDURE — 59050 FETAL MONITOR W/REPORT: CPT

## 2018-05-12 PROCEDURE — 86850 RBC ANTIBODY SCREEN: CPT

## 2018-05-12 PROCEDURE — 85018 HEMOGLOBIN: CPT

## 2018-05-12 PROCEDURE — 85014 HEMATOCRIT: CPT

## 2018-05-12 PROCEDURE — 85610 PROTHROMBIN TIME: CPT

## 2018-05-12 PROCEDURE — 82962 GLUCOSE BLOOD TEST: CPT

## 2018-05-12 PROCEDURE — 86900 BLOOD TYPING SEROLOGIC ABO: CPT

## 2018-05-12 PROCEDURE — 83615 LACTATE (LD) (LDH) ENZYME: CPT

## 2018-05-12 PROCEDURE — 85027 COMPLETE CBC AUTOMATED: CPT

## 2018-05-12 PROCEDURE — 81001 URINALYSIS AUTO W/SCOPE: CPT

## 2018-05-12 PROCEDURE — 84156 ASSAY OF PROTEIN URINE: CPT

## 2018-05-12 PROCEDURE — 84550 ASSAY OF BLOOD/URIC ACID: CPT

## 2018-05-12 PROCEDURE — 85384 FIBRINOGEN ACTIVITY: CPT

## 2018-05-12 PROCEDURE — 59025 FETAL NON-STRESS TEST: CPT

## 2018-05-12 PROCEDURE — 80053 COMPREHEN METABOLIC PANEL: CPT

## 2018-05-12 RX ORDER — INSULIN LISPRO 100/ML
5 VIAL (ML) SUBCUTANEOUS
Qty: 0 | Refills: 0 | COMMUNITY

## 2018-05-12 RX ORDER — VALACYCLOVIR 500 MG/1
1 TABLET, FILM COATED ORAL
Qty: 0 | Refills: 0 | COMMUNITY

## 2018-05-12 RX ORDER — IBUPROFEN 200 MG
1 TABLET ORAL
Qty: 0 | Refills: 0 | DISCHARGE
Start: 2018-05-12

## 2018-05-12 RX ORDER — ACETAMINOPHEN 500 MG
3 TABLET ORAL
Qty: 0 | Refills: 0 | DISCHARGE
Start: 2018-05-12

## 2018-05-12 RX ORDER — HUMAN INSULIN 100 [IU]/ML
30 INJECTION, SUSPENSION SUBCUTANEOUS
Qty: 0 | Refills: 0 | COMMUNITY

## 2018-05-12 RX ADMIN — Medication 200 MILLIGRAM(S): at 02:59

## 2018-05-12 RX ADMIN — Medication 1 TABLET(S): at 11:00

## 2018-05-12 RX ADMIN — Medication 975 MILLIGRAM(S): at 18:33

## 2018-05-12 RX ADMIN — Medication 600 MILLIGRAM(S): at 18:02

## 2018-05-12 RX ADMIN — Medication 975 MILLIGRAM(S): at 11:00

## 2018-05-12 RX ADMIN — Medication 975 MILLIGRAM(S): at 11:30

## 2018-05-12 RX ADMIN — Medication 600 MILLIGRAM(S): at 10:58

## 2018-05-12 RX ADMIN — Medication 200 MILLIGRAM(S): at 14:14

## 2018-05-12 RX ADMIN — Medication 600 MILLIGRAM(S): at 11:28

## 2018-05-12 RX ADMIN — Medication 975 MILLIGRAM(S): at 18:03

## 2018-05-12 NOTE — DISCHARGE NOTE OB - CARE PROVIDER_API CALL
Darvin Archer), Obstetrics and Gynecology  [address] [place] [address]  Phone: [phone]  Fax: [phone]

## 2018-05-12 NOTE — DISCHARGE NOTE OB - SECONDARY DIAGNOSIS.
Pregnancy induced hypertension, antepartum Type 2 diabetes mellitus without complication, with long-term current use of insulin

## 2018-05-12 NOTE — DISCHARGE NOTE OB - MEDICATION SUMMARY - MEDICATIONS TO STOP TAKING
I will STOP taking the medications listed below when I get home from the hospital:    NovoLIN N 100 units/mL subcutaneous suspension  -- 30 unit(s) subcutaneous once a day (at bedtime)    HumaLOG 100 units/mL injectable solution  -- 5 unit(s) injectable 2 times a day (with carb heavy meals)    Valtrex 500 mg oral tablet  -- 1 tab(s) by mouth once a day

## 2018-05-12 NOTE — DISCHARGE NOTE OB - PATIENT PORTAL LINK FT
You can access the [facility] Patient Portal, offered by [facility], by registering with the following website: [email or web address]

## 2018-05-12 NOTE — DIETITIAN INITIAL EVALUATION ADULT. - OTHER INFO
No known food allergies or intolerances reported. Supplementation PTA consisted of a prenatal multivitamin. Pt self monitored BG in the morning, and before and after each meal. States morning fasting BG was mid 90s to 100s, she was then started on Novolin at night which caused BG to rise to 120s, states humalog was then added before meals. Premeal BG was about 85mg/dL, and 1 hour after eating BG was about 111-130mg/dL. Currently pt with a good appetite, eating well, and denies GI distress.

## 2018-05-12 NOTE — DISCHARGE NOTE OB - MEDICATION SUMMARY - MEDICATIONS TO TAKE
I will START or STAY ON the medications listed below when I get home from the hospital:    acetaminophen 325 mg oral tablet  -- 3 tab(s) by mouth every 6 hours  -- Indication: For Vaginal delivery    ibuprofen 600 mg oral tablet  -- 1 tab(s) by mouth every 6 hours  -- Indication: For Vaginal delivery    labetalol 200 mg oral tablet  -- 1 tab(s) by mouth 3 times a day  -- Indication: For Gestational hypertension without significant proteinuria    PNV Prenatal oral tablet  -- 1 tab(s) by mouth once a day  -- Indication: For Vaginal delivery

## 2018-05-12 NOTE — DISCHARGE NOTE OB - HOSPITAL COURSE
IUP 37 WEEKS, GHTN, A2 DM, FOR IOL. RECEIVED CYTOTEC, CF, EPI, PITOCIN. HAD VAVD FEMALE7-8, APGAR 18. D/C PPD#2.

## 2018-05-12 NOTE — DIETITIAN INITIAL EVALUATION ADULT. - NS AS NUTRI INTERV ED CONTENT
Patient educated on nutritional needs to support lactation. Patient educated on postpartum dietary recommendations, including risk of development of T2DM and reinforced importance of DM screening 6-12 weeks postpartum.

## 2018-05-12 NOTE — DISCHARGE NOTE OB - CARE PLAN
Principal Discharge DX:	Vaginal delivery  Goal:	D/C HOME  Assessment and plan of treatment:	LABETALOL 200 MG Q 8 HRS. HOLD DOSE BP </= 110/80  2 HR GCT 8 WEEKS  Secondary Diagnosis:	Pregnancy induced hypertension, antepartum  Secondary Diagnosis:	Type 2 diabetes mellitus without complication, with long-term current use of insulin

## 2018-05-12 NOTE — DIETITIAN INITIAL EVALUATION ADULT. - ENERGY NEEDS
Ht:5ft8in, Wt:248pounds (prepregnancy),   BMI:37.8, IBW:140pounds (+/-10%), 177%IBW  Pertinent Information: Pt admitted at 37.1 weeks gestation, s/p VAVD. Noted with GDM.

## 2018-05-12 NOTE — DISCHARGE NOTE OB - MATERIALS PROVIDED
Guide to Postpartum Care/Birth Certificate Instructions/Discharge Medication Information for Patients and Families Pocket Guide/Back To Sleep Handout/[facility] Hearing Screen Program/Breastfeeding Log/Breastfeeding Mother’s Support Group Information/Breastfeeding Guide and Packet/Shaken Baby Prevention Handout/[facility] [place] Screening Program

## 2018-05-12 NOTE — DIETITIAN INITIAL EVALUATION ADULT. - ADHERENCE
good/Pt states she was diagnosed with GDM at about 24 weeks and from that point forward she counted CHOs, limited portion sizes, and followed a healthy diet high in vegetables. Prior to this pt followed an unrestricted diet.

## 2018-05-19 ENCOUNTER — INPATIENT (INPATIENT)
Facility: HOSPITAL | Age: 33
LOS: 0 days | Discharge: HOME | DRG: 776 | End: 2018-05-20
Attending: OBSTETRICS & GYNECOLOGY | Admitting: OBSTETRICS & GYNECOLOGY
Payer: COMMERCIAL

## 2018-05-19 VITALS
HEART RATE: 95 BPM | HEIGHT: 68 IN | TEMPERATURE: 98 F | WEIGHT: 250 LBS | RESPIRATION RATE: 17 BRPM | DIASTOLIC BLOOD PRESSURE: 84 MMHG | SYSTOLIC BLOOD PRESSURE: 146 MMHG | OXYGEN SATURATION: 99 %

## 2018-05-19 DIAGNOSIS — O16.9 UNSPECIFIED MATERNAL HYPERTENSION, UNSPECIFIED TRIMESTER: ICD-10-CM

## 2018-05-19 LAB
ALBUMIN SERPL ELPH-MCNC: 3.9 G/DL — SIGNIFICANT CHANGE UP (ref 3.3–5)
ALP SERPL-CCNC: 97 U/L — SIGNIFICANT CHANGE UP (ref 40–120)
ALT FLD-CCNC: 22 U/L — SIGNIFICANT CHANGE UP (ref 10–45)
ANION GAP SERPL CALC-SCNC: 13 MMOL/L — SIGNIFICANT CHANGE UP (ref 5–17)
APPEARANCE UR: CLEAR — SIGNIFICANT CHANGE UP
APTT BLD: 30.1 SEC — SIGNIFICANT CHANGE UP (ref 27.5–37.4)
AST SERPL-CCNC: 14 U/L — SIGNIFICANT CHANGE UP (ref 10–40)
BACTERIA # UR AUTO: ABNORMAL /HPF
BASOPHILS # BLD AUTO: 0.1 K/UL — SIGNIFICANT CHANGE UP (ref 0–0.2)
BASOPHILS NFR BLD AUTO: 0.7 % — SIGNIFICANT CHANGE UP (ref 0–2)
BILIRUB SERPL-MCNC: 0.2 MG/DL — SIGNIFICANT CHANGE UP (ref 0.2–1.2)
BILIRUB UR-MCNC: NEGATIVE — SIGNIFICANT CHANGE UP
BUN SERPL-MCNC: 10 MG/DL — SIGNIFICANT CHANGE UP (ref 7–23)
CALCIUM SERPL-MCNC: 9.8 MG/DL — SIGNIFICANT CHANGE UP (ref 8.4–10.5)
CHLORIDE SERPL-SCNC: 101 MMOL/L — SIGNIFICANT CHANGE UP (ref 96–108)
CO2 SERPL-SCNC: 26 MMOL/L — SIGNIFICANT CHANGE UP (ref 22–31)
COLOR SPEC: SIGNIFICANT CHANGE UP
CREAT SERPL-MCNC: 0.76 MG/DL — SIGNIFICANT CHANGE UP (ref 0.5–1.3)
DIFF PNL FLD: ABNORMAL
EOSINOPHIL # BLD AUTO: 0.1 K/UL — SIGNIFICANT CHANGE UP (ref 0–0.5)
EOSINOPHIL NFR BLD AUTO: 1.4 % — SIGNIFICANT CHANGE UP (ref 0–6)
EPI CELLS # UR: SIGNIFICANT CHANGE UP /HPF
FIBRINOGEN PPP-MCNC: 544 MG/DL — HIGH (ref 310–510)
GLUCOSE SERPL-MCNC: 94 MG/DL — SIGNIFICANT CHANGE UP (ref 70–99)
GLUCOSE UR QL: NEGATIVE — SIGNIFICANT CHANGE UP
HCT VFR BLD CALC: 34.2 % — LOW (ref 34.5–45)
HGB BLD-MCNC: 11.4 G/DL — LOW (ref 11.5–15.5)
INR BLD: 1.03 RATIO — SIGNIFICANT CHANGE UP (ref 0.88–1.16)
KETONES UR-MCNC: NEGATIVE — SIGNIFICANT CHANGE UP
LDH SERPL L TO P-CCNC: 165 U/L — SIGNIFICANT CHANGE UP (ref 50–242)
LEUKOCYTE ESTERASE UR-ACNC: ABNORMAL
LYMPHOCYTES # BLD AUTO: 1.9 K/UL — SIGNIFICANT CHANGE UP (ref 1–3.3)
LYMPHOCYTES # BLD AUTO: 19.1 % — SIGNIFICANT CHANGE UP (ref 13–44)
MAGNESIUM SERPL-MCNC: 4.8 MG/DL — HIGH (ref 1.6–2.6)
MCHC RBC-ENTMCNC: 29 PG — SIGNIFICANT CHANGE UP (ref 27–34)
MCHC RBC-ENTMCNC: 33.5 GM/DL — SIGNIFICANT CHANGE UP (ref 32–36)
MCV RBC AUTO: 86.8 FL — SIGNIFICANT CHANGE UP (ref 80–100)
MONOCYTES # BLD AUTO: 0.6 K/UL — SIGNIFICANT CHANGE UP (ref 0–0.9)
MONOCYTES NFR BLD AUTO: 6.3 % — SIGNIFICANT CHANGE UP (ref 2–14)
NEUTROPHILS # BLD AUTO: 7.3 K/UL — SIGNIFICANT CHANGE UP (ref 1.8–7.4)
NEUTROPHILS NFR BLD AUTO: 72.5 % — SIGNIFICANT CHANGE UP (ref 43–77)
NITRITE UR-MCNC: NEGATIVE — SIGNIFICANT CHANGE UP
PH UR: 7 — SIGNIFICANT CHANGE UP (ref 5–8)
PLATELET # BLD AUTO: 432 K/UL — HIGH (ref 150–400)
POTASSIUM SERPL-MCNC: 3.9 MMOL/L — SIGNIFICANT CHANGE UP (ref 3.5–5.3)
POTASSIUM SERPL-SCNC: 3.9 MMOL/L — SIGNIFICANT CHANGE UP (ref 3.5–5.3)
PROT SERPL-MCNC: 7.5 G/DL — SIGNIFICANT CHANGE UP (ref 6–8.3)
PROT UR-MCNC: NEGATIVE — SIGNIFICANT CHANGE UP
PROTHROM AB SERPL-ACNC: 11.1 SEC — SIGNIFICANT CHANGE UP (ref 9.8–12.7)
RBC # BLD: 3.94 M/UL — SIGNIFICANT CHANGE UP (ref 3.8–5.2)
RBC # FLD: 13.2 % — SIGNIFICANT CHANGE UP (ref 10.3–14.5)
RBC CASTS # UR COMP ASSIST: SIGNIFICANT CHANGE UP /HPF (ref 0–2)
SODIUM SERPL-SCNC: 140 MMOL/L — SIGNIFICANT CHANGE UP (ref 135–145)
SP GR SPEC: 1 — LOW (ref 1.01–1.02)
URATE SERPL-MCNC: 6.8 MG/DL — SIGNIFICANT CHANGE UP (ref 2.5–7)
UROBILINOGEN FLD QL: NEGATIVE — SIGNIFICANT CHANGE UP
WBC # BLD: 10.1 K/UL — SIGNIFICANT CHANGE UP (ref 3.8–10.5)
WBC # FLD AUTO: 10.1 K/UL — SIGNIFICANT CHANGE UP (ref 3.8–10.5)
WBC UR QL: ABNORMAL /HPF (ref 0–5)

## 2018-05-19 PROCEDURE — 93971 EXTREMITY STUDY: CPT | Mod: 26

## 2018-05-19 PROCEDURE — 70553 MRI BRAIN STEM W/O & W/DYE: CPT | Mod: 26

## 2018-05-19 PROCEDURE — 70450 CT HEAD/BRAIN W/O DYE: CPT | Mod: 26

## 2018-05-19 PROCEDURE — 99285 EMERGENCY DEPT VISIT HI MDM: CPT

## 2018-05-19 RX ORDER — SODIUM CHLORIDE 9 MG/ML
1000 INJECTION, SOLUTION INTRAVENOUS
Qty: 0 | Refills: 0 | Status: DISCONTINUED | OUTPATIENT
Start: 2018-05-19 | End: 2018-05-20

## 2018-05-19 RX ORDER — LABETALOL HCL 100 MG
600 TABLET ORAL THREE TIMES A DAY
Qty: 0 | Refills: 0 | Status: DISCONTINUED | OUTPATIENT
Start: 2018-05-19 | End: 2018-05-20

## 2018-05-19 RX ORDER — MAGNESIUM SULFATE 500 MG/ML
4 VIAL (ML) INJECTION ONCE
Qty: 0 | Refills: 0 | Status: COMPLETED | OUTPATIENT
Start: 2018-05-19 | End: 2018-05-19

## 2018-05-19 RX ORDER — HEPARIN SODIUM 5000 [USP'U]/ML
5000 INJECTION INTRAVENOUS; SUBCUTANEOUS EVERY 12 HOURS
Qty: 0 | Refills: 0 | Status: DISCONTINUED | OUTPATIENT
Start: 2018-05-19 | End: 2018-05-20

## 2018-05-19 RX ORDER — MAGNESIUM SULFATE 500 MG/ML
2 VIAL (ML) INJECTION
Qty: 40 | Refills: 0 | Status: DISCONTINUED | OUTPATIENT
Start: 2018-05-19 | End: 2018-05-20

## 2018-05-19 RX ORDER — LABETALOL HCL 100 MG
600 TABLET ORAL ONCE
Qty: 0 | Refills: 0 | Status: COMPLETED | OUTPATIENT
Start: 2018-05-19 | End: 2018-05-19

## 2018-05-19 RX ORDER — ACETAMINOPHEN 500 MG
975 TABLET ORAL EVERY 6 HOURS
Qty: 0 | Refills: 0 | Status: DISCONTINUED | OUTPATIENT
Start: 2018-05-19 | End: 2018-05-20

## 2018-05-19 RX ORDER — MAGNESIUM SULFATE 500 MG/ML
2 VIAL (ML) INJECTION ONCE
Qty: 0 | Refills: 0 | Status: COMPLETED | OUTPATIENT
Start: 2018-05-19 | End: 2018-05-19

## 2018-05-19 RX ADMIN — Medication 50 GRAM(S): at 14:25

## 2018-05-19 RX ADMIN — Medication 600 MILLIGRAM(S): at 14:25

## 2018-05-19 RX ADMIN — Medication 300 GRAM(S): at 17:05

## 2018-05-19 RX ADMIN — Medication 600 MILLIGRAM(S): at 21:42

## 2018-05-19 RX ADMIN — Medication 50 GM/HR: at 17:22

## 2018-05-19 NOTE — ED PROVIDER NOTE - PHYSICAL EXAMINATION
Constitutional: no acute distress   Eyes: scleral hemorrhage L eye    ENMT: mucus membranes moist  Neck: supple   Back: normal shape, ROM intact   Respiratory: CTAB  Cardiovascular: RRR, S1/S2  Gastrointestinal: soft, non-tender, normal bowel sounds   Extremities: R foot w/ pitting edema. No cynaosis/clubbing   Vascular: radial pulses intact   Neurological: A&Ox3  Skin: warm and dry   Musculoskeletal: ROM intact  Psychiatric: appropriate affect

## 2018-05-19 NOTE — ED ADULT NURSE NOTE - OBJECTIVE STATEMENT
32 y.o female 1 week and 2 days post partum with pmh gestational DM and HTN on labetalol PO at home daily presenting to ED c/o elevated bp at home, r.  ankle swelling and seeing black flashes x last night. pt states that she has been monitoring her bp at home over the passed few days and her bp has been 160s systolic. states she called her doctor and he increased her bp med but pt states her bp has still remained elevated. denies any HA, dizziness, numbness, tingling, nausea, or blurred vision. no neuro deficits present upon assessment. pts systolic bp 140 upon ED arrival and 130s systolic upon assessing pt. pt states last night she saw black flashes x 1 episode, none today. labs and line obtained, pt pending ct scan.  remains at the bedside. VS stable safety maintained.

## 2018-05-19 NOTE — ED PROVIDER NOTE - NS ED ROS FT
General: denies fevers, chills  Skin/Breast: denies rash   Ophthalmologic: per HPI  ENMT: denies throat pain  Respiratory and Thorax: denies cough, denies shortness of breath  Cardiovascular: denies chest pain, palpitations. Denies LE swelling   Gastrointestinal: denies abdominal pain/ nausea/ vomiting/ diarrhea.    Genitourinary: Denies dysuria  Musculoskeletal: Denies mylagias   Neurological: Denies syncope  Psychiatric: Denies mood disturbance   Hematology/Lymphatics: denies bleeding/bruising. Denies skin lumps

## 2018-05-19 NOTE — ED PROVIDER NOTE - PROGRESS NOTE DETAILS
Resident Olivia: Spoke with Dr. Mccarthy patient's OB, agree w/ CT head and LE dopplers. Suggested increasing patient's labetalol to 600mg TID. Will see patient in ED shortly.

## 2018-05-19 NOTE — ED ADULT TRIAGE NOTE - CHIEF COMPLAINT QUOTE
Elevated BP on Labetalol, pt recently delivered on May 10, pt states that she gave birth at 31 weeks

## 2018-05-19 NOTE — H&P ADULT - NSHPLABSRESULTS_GEN_ALL_CORE
LABS:                        11.4   10.1  )-----------( 432      ( 19 May 2018 12:50 )             34.2     05-19    140  |  101  |  10  ----------------------------<  94  3.9   |  26  |  0.76    Ca    9.8      19 May 2018 12:50    TPro  7.5  /  Alb  3.9  /  TBili  0.2  /  DBili  x   /  AST  14  /  ALT  22  /  AlkPhos  97  05-19    PT/INR - ( 19 May 2018 12:50 )   PT: 11.1 sec;   INR: 1.03 ratio         PTT - ( 19 May 2018 12:50 )  PTT:30.1 sec      Blood Type: A Positive      RADIOLOGY & ADDITIONAL STUDIES:      EXAM:  DUPLEX EXT VEINS LOWER RT                            PROCEDURE DATE:  05/19/2018            INTERPRETATION:  CLINICAL INFORMATION: Right leg swelling and tenderness.   Rule out deep vein thrombosis.    COMPARISON: None available.    TECHNIQUE: Duplex sonography of the RIGHT LOWER extremity with color and   spectral Doppler, with and without compression.      FINDINGS:    There is normal compressibility of the right common femoral, femoral and   popliteal veins. No calf vein thrombosis is detected.    The contralateral common femoral vein is patent.    Doppler examination shows normal spontaneous and phasic flow.    IMPRESSION:     No evidence of right lower extremity deep venous thrombosis.                              MARGARET CAMARA M.D., ATTENDING RADIOLOGIST [phone]  This document has been electronically signed. May 19 2018  1:47PM            Head CT pending

## 2018-05-19 NOTE — H&P ADULT - ASSESSMENT
33 y/o  with chronic hypertension presents POD#10 s/p VAVD with superimposed preeclampsia with severe features.  BPs are mildly elevated but patient has symptoms of severe preeclampsia.  Patient also complains of RLE swelling but dopplers are negative for DVT.

## 2018-05-19 NOTE — ED PROVIDER NOTE - OBJECTIVE STATEMENT
32F s/p vaginal delivery @ 37 weeks on 5/10/18, pregnancy c/b HTN and gestational diabetes presenting with elevated BP for several days. Patient states that she had HTN during pregnancy and was started on Labetalol 200 TID. Her delivery was induced at 37 weeks 2/2 HTN. 32F s/p vaginal delivery @ 37 weeks on 5/10/18, pregnancy c/b HTN and gestational diabetes presenting with elevated BP for several days. Patient states that she had HTN during pregnancy and was started on Labetalol 200 TID. Her delivery was induced at 37 weeks 2/2 HTN, but states she did not have preeclampsia. States she has been checking her BP at home and noticed elevated BP around 150s-160s/90-100s. Called her OBGYN who increased labetalol to 400mg TID yesterday. Noticed "flashing lights" in L eye, denies blurry/double vision. Also noticed swelling in R foot that started last night. Denies chest pain, SOB, palpitations. Denies dysuria, hematuria.     OBGYN: 32F s/p vaginal delivery @ 37 weeks on 5/10/18, pregnancy c/b HTN and gestational diabetes presenting with elevated BP for several days. Patient states that she had HTN during pregnancy and was started on Labetalol 200 TID. Her delivery was induced at 37 weeks 2/2 HTN, but states she did not have preeclampsia. States she has been checking her BP at home and noticed elevated BP around 150s-160s/90-100s. Called her OBGYN who increased labetalol to 400mg TID yesterday. Noticed "flashing lights" in L eye, denies blurry/double vision. Also noticed swelling in R foot that started last night. Denies chest pain, SOB, palpitations. Denies dysuria, hematuria.     OBGYN: Dr. Mccarthy

## 2018-05-19 NOTE — ED PROVIDER NOTE - MEDICAL DECISION MAKING DETAILS
Resident Tamky: 32F s/p vaginal delivery @ 37 weeks on 5/10/18, pregnancy c/b HTN and gestational diabetes presenting with elevated BP, vision changes and R foot swelling. Will need to r/o eclampsia, PRES syndrome and DVT. Resident Aronsky: 32F s/p vaginal delivery @ 37 weeks on 5/10/18, pregnancy c/b HTN and gestational diabetes presenting with elevated BP, vision changes and R foot swelling. Will need to r/o eclampsia, PRES syndrome and DVT.ZR

## 2018-05-19 NOTE — H&P ADULT - NSHPPHYSICALEXAM_GEN_ALL_CORE
Vital Signs Last 24 Hrs  T(C): 36.7 (19 May 2018 13:30), Max: 36.7 (19 May 2018 11:24)  T(F): 98.1 (19 May 2018 13:30), Max: 98.1 (19 May 2018 11:24)  HR: 76 (19 May 2018 13:30) (76 - 95)  BP: 148/87 (19 May 2018 13:30) (134/81 - 148/87)  BP(mean): --  RR: 16 (19 May 2018 13:30) (16 - 18)  SpO2: 98% (19 May 2018 13:30) (98% - 100%)    PHYSICAL EXAM:      Constitutional: alert and oriented x 3    Gastrointestinal: soft, non tender, + bowel sounds. No hepatosplenomegaly, no palpable masses    Extremities: RLE 3+ edema, no erythema, LLE 2+ edema

## 2018-05-19 NOTE — H&P ADULT - HISTORY OF PRESENT ILLNESS
31 y/o  with chronic hypertension presents POD#10 s/p VAVD with elevated BP at home, headache, and visual changes. Patient was discharged postpartum in stable condition on labetalol 200 TID.  She was monitoring BP at home and 2 days ago noted BPs 160s/100s.  Patient called her OB yesterday and was instructed to increast labetalol to 400 tid.  She was still having elevated BPs (150s-160s/100), a mild frontal headache, and intermittent flashes of light in her left eye since yesterday. Patient also complains of RLE swelling and right ankle pain.

## 2018-05-19 NOTE — H&P ADULT - PROBLEM SELECTOR PLAN 1
-Magnesium infusion  -Increase labetalol to 600 TID  -f/u HELLP labs, CT head  -Monitor BPs and symptoms    Patient seen and discussed with Dr Chance Carlson PGY2 -Magnesium infusion  -Increase labetalol to 600 TID  -f/u HELLP labs, CT head  -Monitor BPs and symptoms    Patient seen and discussed with Dr Fabio Carlson PGY2

## 2018-05-20 ENCOUNTER — TRANSCRIPTION ENCOUNTER (OUTPATIENT)
Age: 33
End: 2018-05-20

## 2018-05-20 VITALS
HEART RATE: 77 BPM | OXYGEN SATURATION: 100 % | SYSTOLIC BLOOD PRESSURE: 125 MMHG | DIASTOLIC BLOOD PRESSURE: 82 MMHG | RESPIRATION RATE: 18 BRPM | TEMPERATURE: 97 F

## 2018-05-20 PROBLEM — O24.419 GESTATIONAL DIABETES MELLITUS IN PREGNANCY, UNSPECIFIED CONTROL: Chronic | Status: ACTIVE | Noted: 2018-05-19

## 2018-05-20 LAB
ALBUMIN SERPL ELPH-MCNC: 3.8 G/DL — SIGNIFICANT CHANGE UP (ref 3.3–5)
ALP SERPL-CCNC: 92 U/L — SIGNIFICANT CHANGE UP (ref 40–120)
ALT FLD-CCNC: 17 U/L — SIGNIFICANT CHANGE UP (ref 10–45)
ANION GAP SERPL CALC-SCNC: 11 MMOL/L — SIGNIFICANT CHANGE UP (ref 5–17)
APTT BLD: 30.1 SEC — SIGNIFICANT CHANGE UP (ref 27.5–37.4)
AST SERPL-CCNC: 12 U/L — SIGNIFICANT CHANGE UP (ref 10–40)
BASOPHILS # BLD AUTO: 0.1 K/UL — SIGNIFICANT CHANGE UP (ref 0–0.2)
BASOPHILS NFR BLD AUTO: 0.5 % — SIGNIFICANT CHANGE UP (ref 0–2)
BILIRUB SERPL-MCNC: 0.2 MG/DL — SIGNIFICANT CHANGE UP (ref 0.2–1.2)
BLD GP AB SCN SERPL QL: NEGATIVE — SIGNIFICANT CHANGE UP
BUN SERPL-MCNC: 9 MG/DL — SIGNIFICANT CHANGE UP (ref 7–23)
CALCIUM SERPL-MCNC: 8.2 MG/DL — LOW (ref 8.4–10.5)
CHLORIDE SERPL-SCNC: 100 MMOL/L — SIGNIFICANT CHANGE UP (ref 96–108)
CO2 SERPL-SCNC: 27 MMOL/L — SIGNIFICANT CHANGE UP (ref 22–31)
CREAT SERPL-MCNC: 0.85 MG/DL — SIGNIFICANT CHANGE UP (ref 0.5–1.3)
EOSINOPHIL # BLD AUTO: 0.1 K/UL — SIGNIFICANT CHANGE UP (ref 0–0.5)
EOSINOPHIL NFR BLD AUTO: 0.9 % — SIGNIFICANT CHANGE UP (ref 0–6)
FIBRINOGEN PPP-MCNC: 505 MG/DL — SIGNIFICANT CHANGE UP (ref 310–510)
FSH SERPL-MCNC: 1 IU/L — SIGNIFICANT CHANGE UP
GLUCOSE SERPL-MCNC: 91 MG/DL — SIGNIFICANT CHANGE UP (ref 70–99)
HCT VFR BLD CALC: 32.3 % — LOW (ref 34.5–45)
HGB BLD-MCNC: 10.9 G/DL — LOW (ref 11.5–15.5)
LDH SERPL L TO P-CCNC: 147 U/L — SIGNIFICANT CHANGE UP (ref 50–242)
LH SERPL-ACNC: <0.1 IU/L — SIGNIFICANT CHANGE UP
LYMPHOCYTES # BLD AUTO: 19.6 % — SIGNIFICANT CHANGE UP (ref 13–44)
LYMPHOCYTES # BLD AUTO: 2.2 K/UL — SIGNIFICANT CHANGE UP (ref 1–3.3)
MAGNESIUM SERPL-MCNC: 5.6 MG/DL — HIGH (ref 1.6–2.6)
MAGNESIUM SERPL-MCNC: 6 MG/DL — HIGH (ref 1.6–2.6)
MCHC RBC-ENTMCNC: 28.7 PG — SIGNIFICANT CHANGE UP (ref 27–34)
MCHC RBC-ENTMCNC: 33.6 GM/DL — SIGNIFICANT CHANGE UP (ref 32–36)
MCV RBC AUTO: 85.6 FL — SIGNIFICANT CHANGE UP (ref 80–100)
MONOCYTES # BLD AUTO: 0.9 K/UL — SIGNIFICANT CHANGE UP (ref 0–0.9)
MONOCYTES NFR BLD AUTO: 8.2 % — SIGNIFICANT CHANGE UP (ref 2–14)
NEUTROPHILS # BLD AUTO: 7.9 K/UL — HIGH (ref 1.8–7.4)
NEUTROPHILS NFR BLD AUTO: 70.8 % — SIGNIFICANT CHANGE UP (ref 43–77)
PLATELET # BLD AUTO: 393 K/UL — SIGNIFICANT CHANGE UP (ref 150–400)
POTASSIUM SERPL-MCNC: 4.3 MMOL/L — SIGNIFICANT CHANGE UP (ref 3.5–5.3)
POTASSIUM SERPL-SCNC: 4.3 MMOL/L — SIGNIFICANT CHANGE UP (ref 3.5–5.3)
PROLACTIN SERPL-MCNC: 376.7 NG/ML — HIGH (ref 3.4–24.1)
PROT SERPL-MCNC: 7.2 G/DL — SIGNIFICANT CHANGE UP (ref 6–8.3)
RBC # BLD: 3.78 M/UL — LOW (ref 3.8–5.2)
RBC # FLD: 13.4 % — SIGNIFICANT CHANGE UP (ref 10.3–14.5)
RH IG SCN BLD-IMP: POSITIVE — SIGNIFICANT CHANGE UP
SODIUM SERPL-SCNC: 138 MMOL/L — SIGNIFICANT CHANGE UP (ref 135–145)
T4 FREE SERPL-MCNC: 1.1 NG/DL — SIGNIFICANT CHANGE UP (ref 0.9–1.8)
TSH SERPL-MCNC: 0.84 UIU/ML — SIGNIFICANT CHANGE UP (ref 0.27–4.2)
WBC # BLD: 11.1 K/UL — HIGH (ref 3.8–10.5)
WBC # FLD AUTO: 11.1 K/UL — HIGH (ref 3.8–10.5)

## 2018-05-20 PROCEDURE — 81001 URINALYSIS AUTO W/SCOPE: CPT

## 2018-05-20 PROCEDURE — 70553 MRI BRAIN STEM W/O & W/DYE: CPT

## 2018-05-20 PROCEDURE — 93971 EXTREMITY STUDY: CPT

## 2018-05-20 PROCEDURE — 84550 ASSAY OF BLOOD/URIC ACID: CPT

## 2018-05-20 PROCEDURE — 70450 CT HEAD/BRAIN W/O DYE: CPT

## 2018-05-20 PROCEDURE — 99285 EMERGENCY DEPT VISIT HI MDM: CPT

## 2018-05-20 PROCEDURE — 83002 ASSAY OF GONADOTROPIN (LH): CPT

## 2018-05-20 PROCEDURE — 83001 ASSAY OF GONADOTROPIN (FSH): CPT

## 2018-05-20 PROCEDURE — 85610 PROTHROMBIN TIME: CPT

## 2018-05-20 PROCEDURE — 85730 THROMBOPLASTIN TIME PARTIAL: CPT

## 2018-05-20 PROCEDURE — 82024 ASSAY OF ACTH: CPT

## 2018-05-20 PROCEDURE — A9585: CPT

## 2018-05-20 PROCEDURE — 85027 COMPLETE CBC AUTOMATED: CPT

## 2018-05-20 PROCEDURE — 84146 ASSAY OF PROLACTIN: CPT

## 2018-05-20 PROCEDURE — 86900 BLOOD TYPING SEROLOGIC ABO: CPT

## 2018-05-20 PROCEDURE — 83735 ASSAY OF MAGNESIUM: CPT

## 2018-05-20 PROCEDURE — 83615 LACTATE (LD) (LDH) ENZYME: CPT

## 2018-05-20 PROCEDURE — 86901 BLOOD TYPING SEROLOGIC RH(D): CPT

## 2018-05-20 PROCEDURE — 85384 FIBRINOGEN ACTIVITY: CPT

## 2018-05-20 PROCEDURE — 86850 RBC ANTIBODY SCREEN: CPT

## 2018-05-20 PROCEDURE — 83003 ASSAY GROWTH HORMONE (HGH): CPT

## 2018-05-20 PROCEDURE — 84443 ASSAY THYROID STIM HORMONE: CPT

## 2018-05-20 PROCEDURE — 80053 COMPREHEN METABOLIC PANEL: CPT

## 2018-05-20 PROCEDURE — 84439 ASSAY OF FREE THYROXINE: CPT

## 2018-05-20 RX ORDER — LABETALOL HCL 100 MG
2 TABLET ORAL
Qty: 0 | Refills: 0 | DISCHARGE
Start: 2018-05-20

## 2018-05-20 RX ORDER — LABETALOL HCL 100 MG
1 TABLET ORAL
Qty: 0 | Refills: 0 | COMMUNITY

## 2018-05-20 RX ADMIN — HEPARIN SODIUM 5000 UNIT(S): 5000 INJECTION INTRAVENOUS; SUBCUTANEOUS at 05:30

## 2018-05-20 RX ADMIN — Medication 600 MILLIGRAM(S): at 20:36

## 2018-05-20 RX ADMIN — Medication 600 MILLIGRAM(S): at 13:24

## 2018-05-20 RX ADMIN — Medication 600 MILLIGRAM(S): at 05:32

## 2018-05-20 NOTE — PROGRESS NOTE ADULT - PROBLEM SELECTOR PLAN 1
- continue Magnesium until 3pm today, seizure prophylaxis  - continue Labetalol 600mg TID  - voiding adequate output. Strict Is/Os  - f/u am HELPP labs  - continue SQH DVT ppx  - routine postpartum care    CADEN Shen, PGY2

## 2018-05-20 NOTE — DISCHARGE NOTE OB - CARE PROVIDER_API CALL
Bita Mccarthy), NSLIJ [facility] [place]  [address] [place] [address] [address]  [place] [address]  Phone: [phone]  Fax: [phone]    Hipolito Keating), Neurological Surgery  [address] [facility]  [address]  [place] [address]  Phone: [phone]  Fax: [phone]

## 2018-05-20 NOTE — PROGRESS NOTE ADULT - SUBJECTIVE AND OBJECTIVE BOX
OB Progress Note: VAVD PPD#11 / HD#2    S: 33yo P1 PPD#2 s/p VAVD with postpartum preeclampsia on Mag. Patient feels well, slept overnight. She is tolerating a regular diet and passing flatus. She is voiding spontaneously, and ambulating without difficulty. Denies CP/SOB. Denies lightheadedness/dizziness. Denies N/V. Denies pain.    O:  Vitals:   Vital Signs Last 24 Hrs  T(C): 36.8 (20 May 2018 01:45), Max: 37 (19 May 2018 21:30)  T(F): 98.2 (20 May 2018 01:45), Max: 98.6 (19 May 2018 21:30)  HR: 78 (20 May 2018 03:45) (66 - 95)  BP: 127/60 (20 May 2018 03:45) (117/72 - 150/74)  BP(mean): 93 (19 May 2018 23:45) (78 - 110)  RR: 17 (20 May 2018 03:45) (16 - 18)  SpO2: 95% (20 May 2018 03:45) (95% - 100%)    MEDICATIONS  (STANDING):  acetaminophen   Tablet. 975 milliGRAM(s) Oral every 6 hours  heparin  Injectable 5000 Unit(s) SubCutaneous every 12 hours  labetalol 600 milliGRAM(s) Oral three times a day  lactated ringers. 1000 milliLiter(s) (50 mL/Hr) IV Continuous <Continuous>  magnesium sulfate Infusion 2 Gm/Hr (50 mL/Hr) IV Continuous <Continuous>    MEDICATIONS  (PRN):      Labs:  Blood type: A Positive  Rubella IgG: RPR: Negative                          10.9<L>   11.1<H> >-----------< 393    ( 05-20 @ 06:05 )             32.3<L>                        11.4<L>   10.1 >-----------< 432<H>    ( 05-19 @ 12:50 )             34.2<L>    05-19-18 @ 12:50      140  |  101  |  10  ----------------------------<  94  3.9   |  26  |  0.76        Ca    9.8      19 May 2018 12:50  Mg     5.6<H>     05-20  Mg     4.8<H>     05-19    TPro  7.5  /  Alb  3.9  /  TBili  0.2  /  DBili  x   /  AST  14  /  ALT  22  /  AlkPhos  97  05-19-18 @ 12:50          Physical Exam:  General: NAD  Heart: RRR  Lungs: CTAB  Abdomen: soft, non-tender, non-distended, fundus firm  Vaginal: Lochia wnl  Extremities: No erythema/edema

## 2018-05-20 NOTE — DISCHARGE NOTE OB - CARE PLAN
Principal Discharge DX:	Preeclampsia in postpartum period  Goal:	recovery and BP control  Assessment and plan of treatment:	Continue to take labetalol as prescribed, sent to your pharmacy by Dr Mccarthy. Take your BP at home. Call the office for appointment confirmation and with any concerns.   Follow up with neuro-ophtho sofiaal as outpatient with Dr. Albert [phone]).  Follow up in office with Dr. Keating/Dr. Woo (Neurosurgery) in two weeks ([phone]).

## 2018-05-20 NOTE — PROGRESS NOTE ADULT - ATTENDING COMMENTS
OB attending     Patient seen and examined, agree with above a/p.  Patient is s/p VAVD on 5/10, now readmitted PPD10 with preeclampsia with severe features (mild to severe BPs with visual symptoms), BPs well controlled on current regimen of 600mg TID, on magnesium for seizure ppx.  Patient with neg LE dopplers.  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    -continue mag x24h  -continue labetalol   -will obtain Neurosurgery consultation  -Dispo pending    Bita armenta MD

## 2018-05-20 NOTE — DISCHARGE NOTE OB - PATIENT PORTAL LINK FT
You can access the [facility] Patient Portal, offered by [facility], by registering with the following website: [email or web address]

## 2018-05-20 NOTE — DISCHARGE NOTE OB - MEDICATION SUMMARY - MEDICATIONS TO TAKE
I will START or STAY ON the medications listed below when I get home from the hospital:    acetaminophen 325 mg oral tablet  -- 3 tab(s) by mouth every 6 hours  -- Indication: For Home med    ibuprofen 600 mg oral tablet  -- 1 tab(s) by mouth every 6 hours  -- Indication: For Home med    labetalol 300 mg oral tablet  -- 2 tab(s) by mouth 3 times a day  -- Indication: For Preeclampsia in postpartum period    PNV Prenatal oral tablet  -- 1 tab(s) by mouth once a day  -- Indication: For Preeclampsia in postpartum period

## 2018-05-20 NOTE — DISCHARGE NOTE OB - HOSPITAL COURSE
32yoF  s/p VAVD on 5/9 c/b superimposed PEC presented on PPD#10 with mild range BP, headache and spots in visual field. She was treated with magnesium and continued on labetalol 600 TID. CT head showed enlarged pituitary. MRI of head revealed pituitary macroadenoma 3.6x1.8x2.2xcm with mild optic chiasm compression. Neurosurgery consulted and recommended checking pituitary function and outpatient follow up for reimaging in 6-12months. Discharged in stable condition on HD#2.

## 2018-05-20 NOTE — DISCHARGE NOTE OB - PLAN OF CARE
recovery and BP control Continue to take labetalol as prescribed, sent to your pharmacy by Dr Mccarthy. Take your BP at home. Call the office for appointment confirmation and with any concerns.   Follow up with neuro-ophtho cortes as outpatient with Dr. Albert [phone]).  Follow up in office with Dr. Keating/Dr. Woo (Neurosurgery) in two weeks ([phone]).

## 2018-05-20 NOTE — PROGRESS NOTE ADULT - ASSESSMENT
31 y/o  with chronic hypertension PPD#11 s/p VAVD with superimposed preeclampsia with severe features.   BPs mostly normotensive with intermittent mild range elevated. Pt denies sx of preeclampsia  Pt's headache is now resolved  CT head with "enlarged pituitary," unclear significance. MRI performed overnight, f/u final read

## 2018-05-20 NOTE — CONSULT NOTE ADULT - SUBJECTIVE AND OBJECTIVE BOX
Pager: [phone]     HPI: 32 year old female, 11 days post-partum p/w elevated blood pressure and floaters in left eye. She was readmitted to hospital for elevated blood pressure and floaters of left eye on 18. CT and subsequently MRI revealed sellar mass abutting optic chiasm. Patient denies headache, nausea, vomiting, weakness, numbness, tingling. She also denies vision changes and states that her floaters have resolved. Denies weight gain/loss, urinary frequency, fatigue, cold intolerance.     Patient underwent uneventful pregnancy, prenatal testing normal,  11 days ago, baby healthy.     PAST MEDICAL HISTORY   Hypertension  Gestational diabetes    PAST SURGICAL HISTORY   No significant past surgical history    ALLERGY  No Known Allergies    MEDICATIONS:  Antibiotics:    Neuro:  acetaminophen   Tablet. 975 milliGRAM(s) Oral every 6 hours  magnesium sulfate Infusion 2 Gm/Hr IV Continuous <Continuous>    Anticoagulation:  heparin  Injectable 5000 Unit(s) SubCutaneous every 12 hours    Other:  labetalol 600 milliGRAM(s) Oral three times a day  lactated ringers. 1000 milliLiter(s) IV Continuous <Continuous>    REVIEW OF SYSTEMS:  Check here if all are normal other than Neurological [x]  General:  Eyes:  ENT:  Cardiac:  Respiratory:  GI:  Musculoskeletal:   Skin:  Neurologic:   Psychiatric:     PHYSICAL EXAMINATION:   T(C): 36.6 (18 @ 09:15), Max: 37 (18 @ 21:30)  HR: 71 (18 @ 09:15) (66 - 95)  BP: 112/67 (18 @ 09:15) (112/67 - 150/74)  RR: 18 (18 @ 09:15) (16 - 18)  SpO2: 98% (18 @ 09:15) (95% - 100%)  Wt(kg): --Height (cm): 172.72 ( @ 11:24)  Weight (kg): 113.4 ( @ 11:24)    General Examination: well-appearing female, NAD    AOx3, Following Commands  CN: PERRL, EOMI, Visual fields full to confrontation, V1-3 intact, no facial droop appreciated, hearing grossly intact, palate elevation symmetric, tongue midline, shoulder shrug 5/5  Motor: 5/5 throughout, no drift  Sensation: intact to light touch  Reflexes: No clonus or babinski  Gait: not assessed     LABS:                        10.9   11.1  )-----------( 393      ( 20 May 2018 06:05 )             32.3         138  |  100  |  9   ----------------------------<  91  4.3   |  27  |  0.85    Ca    8.2<L>      20 May 2018 06:08  Mg     5.6         TPro  7.2  /  Alb  3.8  /  TBili  0.2  /  DBili  x   /  AST  12  /  ALT  17  /  AlkPhos  92  -    PT/INR - ( 20 May 2018 06:05 )   PT: 10.8 sec;   INR: 1.00 ratio         PTT - ( 20 May 2018 06:05 )  PTT:30.1 sec  Urinalysis Basic - ( 19 May 2018 14:07 )    Color: PL Yellow / Appearance: Clear / S.005 / pH: x  Gluc: x / Ketone: Negative  / Bili: Negative / Urobili: Negative   Blood: x / Protein: Negative / Nitrite: Negative   Leuk Esterase: Small / RBC: 0-2 /HPF / WBC 5-10 /HPF   Sq Epi: x / Non Sq Epi: Occasional /HPF / Bacteria: Few /HPF 1.72

## 2018-05-20 NOTE — CONSULT NOTE ADULT - ASSESSMENT
32F 11 days post-partum p/w high blood pressure and left eye floaters found to have sellar mass abutting optic chiasm. Patient is asymptomatic concerning sellar mass on evaluation. Discussed further management options with patient and family and we agreed to begin further work up with hormone levels in hospital and continue to monitor her as outpatient.     -No acute neurosurgical intervention indicated at this time  -Neurochecks q4h  -Ordered basic pituitary panel (FSH, LH, ACTH, AM cortisol level, TSH, Prolactin, Insulin-like GF)  -Patient will need neuro-ophtho eval as outpatient with Dr. Albert [phone])  -Patient may follow up in office with Dr. Keating/Dr. Woo in two weeks (([phone])

## 2018-05-21 LAB
ACTH SER-ACNC: 45 PG/ML — SIGNIFICANT CHANGE UP (ref 5–46)
GH SERPL-MCNC: 0.06 NG/ML — SIGNIFICANT CHANGE UP
INSULIN-LIKE GROWTH FACTOR 1 INTERPRETATION: SIGNIFICANT CHANGE UP
INSULIN-LIKE GROWTH FACTOR 1: 376 NG/ML — HIGH (ref 85–283)

## 2018-06-17 ENCOUNTER — TRANSCRIPTION ENCOUNTER (OUTPATIENT)
Age: 33
End: 2018-06-17

## 2018-07-20 ENCOUNTER — TRANSCRIPTION ENCOUNTER (OUTPATIENT)
Age: 33
End: 2018-07-20

## 2018-09-05 NOTE — ED ADULT TRIAGE NOTE - HEART RATE (BEATS/MIN)
----- Message from Sophia Bautista sent at 9/5/2018  8:20 AM CDT -----  Contact: Pt  Can the clinic reply in [facility]:   No      Please refill the medication(s) listed below. Please call the patient when the prescription(s) is ready for  at the phone number [phone]    Medication #1: Mirtazapine 2 month supply     Medication #2:      Preferred Pharmacy:[facility] [address] [place] [address] [id number] [facility]                                       95

## 2018-11-26 ENCOUNTER — RESULT REVIEW (OUTPATIENT)
Age: 33
End: 2018-11-26

## 2019-05-11 ENCOUNTER — TRANSCRIPTION ENCOUNTER (OUTPATIENT)
Age: 34
End: 2019-05-11

## 2019-07-05 ENCOUNTER — EMERGENCY (EMERGENCY)
Facility: HOSPITAL | Age: 34
LOS: 1 days | Discharge: HOME | End: 2019-07-05
Attending: EMERGENCY MEDICINE
Payer: COMMERCIAL

## 2019-07-05 VITALS
SYSTOLIC BLOOD PRESSURE: 123 MMHG | WEIGHT: 233.91 LBS | RESPIRATION RATE: 18 BRPM | HEIGHT: 68 IN | DIASTOLIC BLOOD PRESSURE: 77 MMHG | HEART RATE: 86 BPM | OXYGEN SATURATION: 99 % | TEMPERATURE: 98 F

## 2019-07-05 VITALS
RESPIRATION RATE: 18 BRPM | OXYGEN SATURATION: 99 % | DIASTOLIC BLOOD PRESSURE: 80 MMHG | SYSTOLIC BLOOD PRESSURE: 133 MMHG | HEART RATE: 81 BPM

## 2019-07-05 PROBLEM — I10 ESSENTIAL (PRIMARY) HYPERTENSION: Chronic | Status: ACTIVE | Noted: 2018-05-19

## 2019-07-05 LAB
ALBUMIN SERPL ELPH-MCNC: 4.5 G/DL — SIGNIFICANT CHANGE UP (ref 3.3–5)
ALP SERPL-CCNC: 154 U/L — HIGH (ref 40–120)
ALT FLD-CCNC: 117 U/L — HIGH (ref 10–45)
ANION GAP SERPL CALC-SCNC: 12 MMOL/L — SIGNIFICANT CHANGE UP (ref 5–17)
APPEARANCE UR: CLEAR — SIGNIFICANT CHANGE UP
AST SERPL-CCNC: 169 U/L — HIGH (ref 10–40)
BACTERIA # UR AUTO: NEGATIVE — SIGNIFICANT CHANGE UP
BASOPHILS # BLD AUTO: 0 K/UL — SIGNIFICANT CHANGE UP (ref 0–0.2)
BASOPHILS NFR BLD AUTO: 0.1 % — SIGNIFICANT CHANGE UP (ref 0–2)
BILIRUB SERPL-MCNC: 1.1 MG/DL — SIGNIFICANT CHANGE UP (ref 0.2–1.2)
BILIRUB UR-MCNC: NEGATIVE — SIGNIFICANT CHANGE UP
BUN SERPL-MCNC: 10 MG/DL — SIGNIFICANT CHANGE UP (ref 7–23)
CALCIUM SERPL-MCNC: 8.8 MG/DL — SIGNIFICANT CHANGE UP (ref 8.4–10.5)
CHLORIDE SERPL-SCNC: 101 MMOL/L — SIGNIFICANT CHANGE UP (ref 96–108)
CO2 SERPL-SCNC: 24 MMOL/L — SIGNIFICANT CHANGE UP (ref 22–31)
COLOR SPEC: SIGNIFICANT CHANGE UP
CREAT SERPL-MCNC: 0.69 MG/DL — SIGNIFICANT CHANGE UP (ref 0.5–1.3)
DIFF PNL FLD: NEGATIVE — SIGNIFICANT CHANGE UP
EOSINOPHIL # BLD AUTO: 0.1 K/UL — SIGNIFICANT CHANGE UP (ref 0–0.5)
EOSINOPHIL NFR BLD AUTO: 0.5 % — SIGNIFICANT CHANGE UP (ref 0–6)
EPI CELLS # UR: 0 /HPF — SIGNIFICANT CHANGE UP
GLUCOSE SERPL-MCNC: 82 MG/DL — SIGNIFICANT CHANGE UP (ref 70–99)
GLUCOSE UR QL: NEGATIVE — SIGNIFICANT CHANGE UP
HCT VFR BLD CALC: 38.8 % — SIGNIFICANT CHANGE UP (ref 34.5–45)
HGB BLD-MCNC: 12.9 G/DL — SIGNIFICANT CHANGE UP (ref 11.5–15.5)
HYALINE CASTS # UR AUTO: 0 /LPF — SIGNIFICANT CHANGE UP (ref 0–2)
KETONES UR-MCNC: NEGATIVE — SIGNIFICANT CHANGE UP
LEUKOCYTE ESTERASE UR-ACNC: NEGATIVE — SIGNIFICANT CHANGE UP
LIDOCAIN IGE QN: 18 U/L — SIGNIFICANT CHANGE UP (ref 7–60)
LYMPHOCYTES # BLD AUTO: 1.7 K/UL — SIGNIFICANT CHANGE UP (ref 1–3.3)
LYMPHOCYTES # BLD AUTO: 11.1 % — LOW (ref 13–44)
MCHC RBC-ENTMCNC: 27.4 PG — SIGNIFICANT CHANGE UP (ref 27–34)
MCHC RBC-ENTMCNC: 33.1 GM/DL — SIGNIFICANT CHANGE UP (ref 32–36)
MCV RBC AUTO: 82.8 FL — SIGNIFICANT CHANGE UP (ref 80–100)
MONOCYTES # BLD AUTO: 1.1 K/UL — HIGH (ref 0–0.9)
MONOCYTES NFR BLD AUTO: 7.2 % — SIGNIFICANT CHANGE UP (ref 2–14)
NEUTROPHILS # BLD AUTO: 12.1 K/UL — HIGH (ref 1.8–7.4)
NEUTROPHILS NFR BLD AUTO: 81.1 % — HIGH (ref 43–77)
NITRITE UR-MCNC: NEGATIVE — SIGNIFICANT CHANGE UP
PH UR: 7 — SIGNIFICANT CHANGE UP (ref 5–8)
PLATELET # BLD AUTO: 319 K/UL — SIGNIFICANT CHANGE UP (ref 150–400)
POTASSIUM SERPL-MCNC: 4 MMOL/L — SIGNIFICANT CHANGE UP (ref 3.5–5.3)
POTASSIUM SERPL-SCNC: 4 MMOL/L — SIGNIFICANT CHANGE UP (ref 3.5–5.3)
PROT SERPL-MCNC: 7.4 G/DL — SIGNIFICANT CHANGE UP (ref 6–8.3)
PROT UR-MCNC: NEGATIVE — SIGNIFICANT CHANGE UP
RBC # BLD: 4.68 M/UL — SIGNIFICANT CHANGE UP (ref 3.8–5.2)
RBC # FLD: 12.3 % — SIGNIFICANT CHANGE UP (ref 10.3–14.5)
RBC CASTS # UR COMP ASSIST: 1 /HPF — SIGNIFICANT CHANGE UP (ref 0–4)
SODIUM SERPL-SCNC: 137 MMOL/L — SIGNIFICANT CHANGE UP (ref 135–145)
SP GR SPEC: 1.01 — LOW (ref 1.01–1.02)
UROBILINOGEN FLD QL: NEGATIVE — SIGNIFICANT CHANGE UP
WBC # BLD: 15 K/UL — HIGH (ref 3.8–10.5)
WBC # FLD AUTO: 15 K/UL — HIGH (ref 3.8–10.5)
WBC UR QL: 1 /HPF — SIGNIFICANT CHANGE UP (ref 0–5)

## 2019-07-05 PROCEDURE — 99284 EMERGENCY DEPT VISIT MOD MDM: CPT | Mod: 25

## 2019-07-05 PROCEDURE — 99284 EMERGENCY DEPT VISIT MOD MDM: CPT

## 2019-07-05 PROCEDURE — 76700 US EXAM ABDOM COMPLETE: CPT

## 2019-07-05 PROCEDURE — 83690 ASSAY OF LIPASE: CPT

## 2019-07-05 PROCEDURE — 96374 THER/PROPH/DIAG INJ IV PUSH: CPT

## 2019-07-05 PROCEDURE — 85027 COMPLETE CBC AUTOMATED: CPT

## 2019-07-05 PROCEDURE — 81001 URINALYSIS AUTO W/SCOPE: CPT

## 2019-07-05 PROCEDURE — 76700 US EXAM ABDOM COMPLETE: CPT | Mod: 26

## 2019-07-05 PROCEDURE — 80053 COMPREHEN METABOLIC PANEL: CPT

## 2019-07-05 RX ORDER — FAMOTIDINE 10 MG/ML
20 INJECTION INTRAVENOUS ONCE
Refills: 0 | Status: COMPLETED | OUTPATIENT
Start: 2019-07-05 | End: 2019-07-05

## 2019-07-05 RX ORDER — LIDOCAINE 4 G/100G
10 CREAM TOPICAL ONCE
Refills: 0 | Status: COMPLETED | OUTPATIENT
Start: 2019-07-05 | End: 2019-07-05

## 2019-07-05 RX ADMIN — Medication 30 MILLILITER(S): at 16:45

## 2019-07-05 RX ADMIN — LIDOCAINE 10 MILLILITER(S): 4 CREAM TOPICAL at 16:45

## 2019-07-05 RX ADMIN — FAMOTIDINE 20 MILLIGRAM(S): 10 INJECTION INTRAVENOUS at 16:45

## 2019-07-05 NOTE — ED PROVIDER NOTE - NSFOLLOWUPCLINICS_GEN_ALL_ED_FT
[facility]  General Surgery  [address] [place] [address] - [address] [place] [address]  Phone: [phone]  Fax:   Follow Up Time: 1-3 Days

## 2019-07-05 NOTE — ED PROVIDER NOTE - CONSTITUTIONAL, MLM
normal... Well appearing, well nourished, awake, alert, oriented to person, place, time/situation and in no apparent distress. Well appearing, well nourished, awake, alert, oriented to person, place, time/situation and in no apparent distress. Very comfortable appearing

## 2019-07-05 NOTE — ED STATDOCS - OBJECTIVE STATEMENT
34yo F with PMH GERD, IBS, presenting with intermittent abd pain x 3 months, worsening over last 2 weeks. Pt reports abdominal pain occurs randomly and in different locations, most commonly upper abdomen, radiates around b/l to back, described as squeezing and spasming in nature. Pt had endoscopy on 6/11 that was WNL and her GI rx'ed her antispasmodic. No previous colonoscopy. Reports pain is becoming more frequent and more severe, last episode at 10AM today. + associated nausea. Able to eat breakfast today. No vomiting. Taking nexium and antispasmodic with no relief. Reports episodes of loose nb stool today, not atypical for her given IBS. Denies any fever/chills, urinary symptoms, melena, BRBPR.     GI Chip Ramirez 32yo F with PMH GERD, IBS, presenting with intermittent abd pain x 3 months, worsening over last 2 weeks. Pt reports abdominal pain occurs randomly and in different locations, most commonly upper abdomen, radiates around b/l to back, described as squeezing and spasming in nature. Pt had endoscopy on 6/11 that was WNL and her GI rx'ed her antispasmodic. No previous colonoscopy. Reports pain is becoming more frequent and more severe, last episode at 10AM today. + associated nausea. Able to eat breakfast today. No vomiting. Taking nexium and antispasmodic with no relief. Reports episodes of loose nb stool today, not atypical for her given IBS. Denies any fever/chills, urinary symptoms, melena, BRBPR, previous abd surgeries.     GI Chip Ramirez

## 2019-07-05 NOTE — ED PROVIDER NOTE - GASTROINTESTINAL, MLM
Abdomen soft, non-tender, no guarding. No CVAT Abdomen soft, non-tender, no guarding. No CVAT, +Bs throughout

## 2019-07-05 NOTE — ED PROVIDER NOTE - CLINICAL SUMMARY MEDICAL DECISION MAKING FREE TEXT BOX
32 yo F with pmhx GERD and IBS presenting with upper abdominal pain x months. Worsening yesterday. PE unremarkable with no tenderness during abdominal exam. Will obtain labs, urine. Due to elevated LFTs will obtain US of abdomen. Give meds and reassess pending US 32 yo F with pmhx GERD and IBS presenting with upper abdominal pain x months. Worsening yesterday. PE unremarkable with no tenderness during abdominal exam. Will obtain labs, urine. Due to elevated LFTs will obtain US of abdomen. Give meds and reassess pending US    Sasha Pettit MD - Attending Physician: Pt here with acute on chronic upper abd pain x months. Has GI follow-up. Prior EGD showing gastritis/bowel spasm. Very well appearing with no significant tenderness noted. Basic labs for eval. Pain control trial

## 2019-07-05 NOTE — ED PROVIDER NOTE - OBJECTIVE STATEMENT
34 yo F with pmhx GERD and IBS presenting with upper abdominal pain x months. Patient states symptoms began in May that have been intermittent "twisting" pain that can be anywhere in her abdomen. Patient states she went to see a GI doctor and had an upper endoscopy on 6/11. Patient states she has been taking nexium and was prescribed antispasmodics for the past week that she thought was helping. Patient states pain worse yesterday and now is a 10/10. Pain mostly in upper abdomen and radiates to her back now.  Patient admits to constipation and diarrhea on and off since may. Patient denies cp, sob, cough, fever, nv, dysuria, hematuria, flank pain, neck pain, tingling, numbness, and weakness 34 yo F with pmhx GERD and IBS presenting with upper abdominal pain x months. Patient states symptoms began in May that have been intermittent "twisting" pain that can be anywhere in her abdomen, but mostly epigastric. Patient states she went to see a GI doctor and had an upper endoscopy on 6/11, which showed gastritis and spasming. Patient states she has been taking nexium and was prescribed antispasmodics for the past week that she thought was helping. Patient states pain worse yesterday and now is a 10/10. Pain mostly in upper abdomen and radiates around R side to her back now.  Patient admits to constipation and diarrhea on and off since may. Patient denies cp, sob, cough, fever, nv, dysuria, hematuria, flank pain, neck pain, tingling, numbness, and weakness. Had CT in past without findings

## 2019-07-05 NOTE — ED PROVIDER NOTE - ATTENDING CONTRIBUTION TO CARE
Sasha Pettit MD - Attending Physician: I have personally seen and examined this patient. I have discussed the case with the ACP. I have reviewed all pertinent clinical information, including history, physical exam, plan and the ACP’s note and agree except as noted. See MDM

## 2019-07-05 NOTE — ED ADULT NURSE NOTE - OBJECTIVE STATEMENT
32 yo F with pmhx GERD and IBS presenting with upper abdominal pain x months. Patient states symptoms began in May that have been intermittent "twisting" pain that can be anywhere in her abdomen, but mostly epigastric. Patient states she went to see a GI doctor and had an upper endoscopy on 6/11, which showed gastritis and spasming. Patient states she has been taking nexium and was prescribed antispasmodics for the past week that she thought was helping. Patient states pain worse yesterday and now is a 10/10. Pain mostly in upper abdomen and radiates around R side to her back now.  Patient admits to constipation and diarrhea on and off since may. Patient denies cp, sob, cough, fever, nv, dysuria, hematuria, flank pain, neck pain, tingling, numbness, and weakness. Had CT in past without findings

## 2019-07-05 NOTE — ED PROVIDER NOTE - PROGRESS NOTE DETAILS
Noted mildly elevated LFTs not noted on prior labwork. Abd remains benign, but will check RUQ US given labs Patient feeling better. discussed results and will follow up with surgery. Patient PO challenged and able to tolerate

## 2019-07-05 NOTE — ED PROVIDER NOTE - MUSCULOSKELETAL, MLM
Spine appears normal, range of motion is not limited, no muscle or joint tenderness Spine appears normal, range of motion is not limited, no muscle or joint tenderness, normal gait

## 2019-07-14 ENCOUNTER — INPATIENT (INPATIENT)
Facility: HOSPITAL | Age: 34
LOS: 3 days | Discharge: HOME | DRG: 419 | End: 2019-07-18
Attending: SURGERY | Admitting: SURGERY
Payer: COMMERCIAL

## 2019-07-14 VITALS
DIASTOLIC BLOOD PRESSURE: 82 MMHG | HEART RATE: 87 BPM | RESPIRATION RATE: 20 BRPM | OXYGEN SATURATION: 98 % | HEIGHT: 68 IN | WEIGHT: 229.06 LBS | SYSTOLIC BLOOD PRESSURE: 139 MMHG | TEMPERATURE: 98 F

## 2019-07-14 DIAGNOSIS — K82.9 DISEASE OF GALLBLADDER, UNSPECIFIED: ICD-10-CM

## 2019-07-14 LAB
ALBUMIN SERPL ELPH-MCNC: 4.7 G/DL — SIGNIFICANT CHANGE UP (ref 3.3–5)
ALP SERPL-CCNC: 196 U/L — HIGH (ref 40–120)
ALT FLD-CCNC: 211 U/L — HIGH (ref 10–45)
ANION GAP SERPL CALC-SCNC: 15 MMOL/L — SIGNIFICANT CHANGE UP (ref 5–17)
APPEARANCE UR: CLEAR — SIGNIFICANT CHANGE UP
AST SERPL-CCNC: 183 U/L — HIGH (ref 10–40)
BACTERIA # UR AUTO: NEGATIVE — SIGNIFICANT CHANGE UP
BASE EXCESS BLDV CALC-SCNC: 1.6 MMOL/L — SIGNIFICANT CHANGE UP (ref -2–2)
BASOPHILS # BLD AUTO: 0 K/UL — SIGNIFICANT CHANGE UP (ref 0–0.2)
BASOPHILS NFR BLD AUTO: 0.2 % — SIGNIFICANT CHANGE UP (ref 0–2)
BILIRUB SERPL-MCNC: 2.3 MG/DL — HIGH (ref 0.2–1.2)
BILIRUB UR-MCNC: ABNORMAL
BLD GP AB SCN SERPL QL: NEGATIVE — SIGNIFICANT CHANGE UP
BUN SERPL-MCNC: 14 MG/DL — SIGNIFICANT CHANGE UP (ref 7–23)
CALCIUM SERPL-MCNC: 9.3 MG/DL — SIGNIFICANT CHANGE UP (ref 8.4–10.5)
CHLORIDE SERPL-SCNC: 97 MMOL/L — SIGNIFICANT CHANGE UP (ref 96–108)
CO2 BLDV-SCNC: 29 MMOL/L — SIGNIFICANT CHANGE UP (ref 22–30)
CO2 SERPL-SCNC: 23 MMOL/L — SIGNIFICANT CHANGE UP (ref 22–31)
COLOR SPEC: YELLOW — SIGNIFICANT CHANGE UP
CREAT SERPL-MCNC: 0.66 MG/DL — SIGNIFICANT CHANGE UP (ref 0.5–1.3)
DIFF PNL FLD: ABNORMAL
EOSINOPHIL # BLD AUTO: 0.1 K/UL — SIGNIFICANT CHANGE UP (ref 0–0.5)
EOSINOPHIL NFR BLD AUTO: 0.6 % — SIGNIFICANT CHANGE UP (ref 0–6)
EPI CELLS # UR: 3 /HPF — SIGNIFICANT CHANGE UP
GAS PNL BLDV: SIGNIFICANT CHANGE UP
GLUCOSE SERPL-MCNC: 87 MG/DL — SIGNIFICANT CHANGE UP (ref 70–99)
GLUCOSE UR QL: NEGATIVE — SIGNIFICANT CHANGE UP
HCO3 BLDV-SCNC: 28 MMOL/L — SIGNIFICANT CHANGE UP (ref 21–29)
HCT VFR BLD CALC: 39.7 % — SIGNIFICANT CHANGE UP (ref 34.5–45)
HGB BLD-MCNC: 14 G/DL — SIGNIFICANT CHANGE UP (ref 11.5–15.5)
HYALINE CASTS # UR AUTO: 3 /LPF — HIGH (ref 0–2)
KETONES UR-MCNC: ABNORMAL
LEUKOCYTE ESTERASE UR-ACNC: NEGATIVE — SIGNIFICANT CHANGE UP
LIDOCAIN IGE QN: 28 U/L — SIGNIFICANT CHANGE UP (ref 7–60)
LYMPHOCYTES # BLD AUTO: 1.5 K/UL — SIGNIFICANT CHANGE UP (ref 1–3.3)
LYMPHOCYTES # BLD AUTO: 12.8 % — LOW (ref 13–44)
MCHC RBC-ENTMCNC: 29.2 PG — SIGNIFICANT CHANGE UP (ref 27–34)
MCHC RBC-ENTMCNC: 35.3 GM/DL — SIGNIFICANT CHANGE UP (ref 32–36)
MCV RBC AUTO: 82.8 FL — SIGNIFICANT CHANGE UP (ref 80–100)
MONOCYTES # BLD AUTO: 0.7 K/UL — SIGNIFICANT CHANGE UP (ref 0–0.9)
MONOCYTES NFR BLD AUTO: 6.2 % — SIGNIFICANT CHANGE UP (ref 2–14)
NEUTROPHILS # BLD AUTO: 9.1 K/UL — HIGH (ref 1.8–7.4)
NEUTROPHILS NFR BLD AUTO: 80.2 % — HIGH (ref 43–77)
NITRITE UR-MCNC: NEGATIVE — SIGNIFICANT CHANGE UP
PCO2 BLDV: 52 MMHG — HIGH (ref 35–50)
PH BLDV: 7.35 — SIGNIFICANT CHANGE UP (ref 7.35–7.45)
PH UR: 7 — SIGNIFICANT CHANGE UP (ref 5–8)
PLATELET # BLD AUTO: 313 K/UL — SIGNIFICANT CHANGE UP (ref 150–400)
PO2 BLDV: 30 MMHG — SIGNIFICANT CHANGE UP (ref 25–45)
POTASSIUM SERPL-MCNC: 4 MMOL/L — SIGNIFICANT CHANGE UP (ref 3.5–5.3)
POTASSIUM SERPL-SCNC: 4 MMOL/L — SIGNIFICANT CHANGE UP (ref 3.5–5.3)
PROT SERPL-MCNC: 7.9 G/DL — SIGNIFICANT CHANGE UP (ref 6–8.3)
PROT UR-MCNC: ABNORMAL
RBC # BLD: 4.8 M/UL — SIGNIFICANT CHANGE UP (ref 3.8–5.2)
RBC # FLD: 12.2 % — SIGNIFICANT CHANGE UP (ref 10.3–14.5)
RBC CASTS # UR COMP ASSIST: 142 /HPF — HIGH (ref 0–4)
RH IG SCN BLD-IMP: POSITIVE — SIGNIFICANT CHANGE UP
SAO2 % BLDV: 48 % — LOW (ref 67–88)
SODIUM SERPL-SCNC: 135 MMOL/L — SIGNIFICANT CHANGE UP (ref 135–145)
SP GR SPEC: 1.03 — HIGH (ref 1.01–1.02)
UROBILINOGEN FLD QL: ABNORMAL
WBC # BLD: 11.4 K/UL — HIGH (ref 3.8–10.5)
WBC # FLD AUTO: 11.4 K/UL — HIGH (ref 3.8–10.5)
WBC UR QL: 7 /HPF — HIGH (ref 0–5)

## 2019-07-14 PROCEDURE — 93010 ELECTROCARDIOGRAM REPORT: CPT

## 2019-07-14 PROCEDURE — 99222 1ST HOSP IP/OBS MODERATE 55: CPT

## 2019-07-14 PROCEDURE — 76700 US EXAM ABDOM COMPLETE: CPT | Mod: 26

## 2019-07-14 PROCEDURE — 99285 EMERGENCY DEPT VISIT HI MDM: CPT

## 2019-07-14 RX ORDER — SODIUM CHLORIDE 9 MG/ML
1000 INJECTION, SOLUTION INTRAVENOUS
Refills: 0 | Status: DISCONTINUED | OUTPATIENT
Start: 2019-07-14 | End: 2019-07-15

## 2019-07-14 RX ORDER — ENOXAPARIN SODIUM 100 MG/ML
40 INJECTION SUBCUTANEOUS EVERY 24 HOURS
Refills: 0 | Status: DISCONTINUED | OUTPATIENT
Start: 2019-07-14 | End: 2019-07-17

## 2019-07-14 NOTE — ED PROVIDER NOTE - CLINICAL SUMMARY MEDICAL DECISION MAKING FREE TEXT BOX
PT w hx of gb stone now w nausea and vomiting and ruq pain ro evita check sono,labs, pain meds, ivf consult surg  Cedrick Cerda MD, Facep

## 2019-07-14 NOTE — ED ADULT NURSE REASSESSMENT NOTE - NS ED NURSE REASSESS COMMENT FT1
patient appears to be in no acute distress. VSS. pt denies sob, chest pain, n/v/d, headaches & blurry vision. safety maintained. patient appears to be in no acute distress. VSS. pt denies sob, chest pain, n/v/d, headaches & blurry vision. pt awaiting US results. safety maintained.

## 2019-07-14 NOTE — ED ADULT NURSE NOTE - NSIMPLEMENTINTERV_GEN_ALL_ED
Implemented All Universal Safety Interventions:  [place] to call system. Call bell, personal items and telephone within reach. Instruct patient to call for assistance. Room bathroom lighting operational. Non-slip footwear when patient is off stretcher. Physically safe environment: no spills, clutter or unnecessary equipment. Stretcher in lowest position, wheels locked, appropriate side rails in place.

## 2019-07-14 NOTE — H&P ADULT - HISTORY OF PRESENT ILLNESS
34-year-old woman with known gallstones presents with one day of severe RUQ pain associated with 2 episodes of vomiting. Patient was last seen in the ED 9 days ago and was diagnosed with biliary colic. She developed severe RUQ abdominal pain that radiated to the flank and back today. She went to Urgent Care first, given toradol, and was then advised to go to the ED if the pain worsened.   No prior abdominal surgeries. Had a vaginal delivery approximately 1 year ago.   Was started on labetalol after her pregnancy due to pre-eclampsia; has been slowly titrating down on the dose.

## 2019-07-14 NOTE — ED PROVIDER NOTE - CPE EDP HEAD NORM PED
Telephone Encounter by Swapna Brice APN at 06/13/17 03:51 PM     Author:  Swapna Brice APN Service:  (none) Author Type:  Nurse Practitioner     Filed:  06/13/17 03:52 PM Encounter Date:  6/13/2017 Status:  Signed     :  Swapna Brice APN (Nurse Practitioner)            Rx written and given to MA./Electronically Signed by:    SUN Campbell , 6/13/2017[LH1.1T]         Revision History        User Key Date/Time User Provider Type Action    > LH1.1 06/13/17 03:52 PM Swapna Brice APN Nurse Practitioner Sign    T - Template             comprehensive exam...

## 2019-07-14 NOTE — ED PROVIDER NOTE - OBJECTIVE STATEMENT
Private Physician Chip Ramirez GI/not staff,   34y female pmh gallstones, Hypertension, Gestational diabetes, no habits,surgery,cancer,pt comes to ed complains of abd pain.  Was seen approx 9d ago fro abd pain. Dx as gallstones. Was planning to have opt follow up with surg for elective surg. Pt comes to ed complains of worsening abd pain. Now localized to ruq, Worse with meals. Now not tolerating  broth and water. No fever chills. Not pregnant. Had opt ct abd done 5d ago showing gb full of stones. Private Physician Chip Ramirez GI/not staff,   34F h/o gallstones, HTN, Gestational diabetes, obesity, no habits,surgery,cancer,pt comes to ed complains of abd pain. Was seen at [facility] approx 9d ago for abd pain and was Dx with gallstones. Was planning to have opt follow up with surg for elective surg. Today Pt came from urgent with similar problem and was sent to [facility] ED with worsening abd pain. Pain now localized to RUQ, Worse with meals and radiates to the back. Now vomits after meals and does not tolerate food. Denies fever, chills. Not pregnant. Had opt ct abd done 5d ago showing gb full of stones. Private Physician Chip Ramirez GI/not staff,   34F h/o gallstones, HTN, Gestational diabetes, obesity, no habits,surgery,cancer,pt comes to ed complains of abd pain. Was seen at [facility] approx 9d ago for abd pain and was Dx with gallstones. Was planning to have opt follow up with surg for elective surg. Today Pt came from urgent with similar problem and was sent to [facility] ED with worsening abd pain. Pain now localized to RUQ, Worse with meals and radiates to the back. Now vomits after meals and does not tolerate food. Denies fever, chills, skin changes. Not pregnant. Had opt ct abd done 5d ago showing gb full of stones. Private Physician Chip Ramirez GI/not staff,   34F h/o gallstones, HTN, Gestational diabetes, obesity, presents from urgent care to the ed complains of abd pain. Was seen at [facility] approximately 9d ago for abdominal pain and was diagnosed with gallstones. Was planning to have opt follow up with surg for elective surg. Today Pt came from urgent care with similar problem and was sent to [facility] ED with worsening abd pain. Pain now localized to RUQ, worse with meals and radiates to the right side of back. Now vomits after meals and does not tolerate food. LMP yesterday, not pregnant. Denies fever, chills, skin changes, headaches, chest pain, shortness of breath. Had opt ct abd done 5d ago showing gb full of stones.

## 2019-07-14 NOTE — H&P ADULT - ASSESSMENT
Assessment/Plan: 34y Female presents with RUQ pain and elevated bilirubin, likely choledocholithiasis.   Pain now resolved, no abdominal tenderness.   Tb 2.3, alk phos 196. Has known gallstones, and a CBD that measures 4mm. No signs of cholecystitis.     - MRCP in AM to evaluate ducts  - NPO, IV hydration  - DVT ppx    Discussed with Dr. Claros  Pager [phone]

## 2019-07-14 NOTE — H&P ADULT - NSHPLABSRESULTS_GEN_ALL_CORE
CBC Full  -  ( 2019 18:47 )  WBC Count : 11.4 K/uL  RBC Count : 4.80 M/uL  Hemoglobin : 14.0 g/dL  Hematocrit : 39.7 %  Platelet Count - Automated : 313 K/uL  Mean Cell Volume : 82.8 fl  Mean Cell Hemoglobin : 29.2 pg  Mean Cell Hemoglobin Concentration : 35.3 gm/dL  Auto Neutrophil # : 9.1 K/uL  Auto Lymphocyte # : 1.5 K/uL  Auto Monocyte # : 0.7 K/uL  Auto Eosinophil # : 0.1 K/uL  Auto Basophil # : 0.0 K/uL  Auto Neutrophil % : 80.2 %  Auto Lymphocyte % : 12.8 %  Auto Monocyte % : 6.2 %  Auto Eosinophil % : 0.6 %  Auto Basophil % : 0.2 %        135  |  97  |  14  ----------------------------<  87  4.0   |  23  |  0.66    Ca    9.3      2019 18:47    TPro  7.9  /  Alb  4.7  /  TBili  2.3<H>  /  DBili  x   /  AST  183<H>  /  ALT  211<H>  /  AlkPhos  196<H>      LIVER FUNCTIONS - ( 2019 18:47 )  Alb: 4.7 g/dL / Pro: 7.9 g/dL / ALK PHOS: 196 U/L / ALT: 211 U/L / AST: 183 U/L / GGT: x             Urinalysis Basic - ( 2019 19:37 )    Color: Yellow / Appearance: Clear / S.033 / pH: x  Gluc: x / Ketone: Small  / Bili: Small / Urobili: 3 mg/dL   Blood: x / Protein: 30 mg/dL / Nitrite: Negative   Leuk Esterase: Negative / RBC: 142 /hpf / WBC 7 /HPF   Sq Epi: x / Non Sq Epi: 3 /hpf / Bacteria: Negative      RADIOLOGY:

## 2019-07-14 NOTE — ED PROVIDER NOTE - PHYSICAL EXAMINATION
GENERAL: no acute distress, non-toxic appearing  HEAD: normocephalic, atraumatic  HEENT: normal conjunctiva, oral mucosa moist, neck supple  CARDIAC: regular rate and rhythm, normal S1 and S2,  no appreciable murmurs  PULM: clear to ascultation bilaterally, no crackles, rales, rhonchi, or wheezing  GI: +BS, abdomen nondistended, soft, +tenderness in RLQ, +Lorenzana's sign  NEURO: alert and oriented x 3, normal speech, PERRLA, EOMI, no focal motor or sensory deficits, nonantalgic gait  MSK: no visible deformities, no peripheral edema, calf tenderness/redness/swelling  SKIN: no visible rashes, dry, well-perfused  PSYCH: appropriate mood and affect GENERAL: no acute distress, non-toxic appearing  HEAD: normocephalic, atraumatic  HEENT: normal conjunctiva, oral mucosa moist, neck supple  CARDIAC: regular rate and rhythm, normal S1 and S2,  no appreciable murmurs  PULM: clear to ascultation bilaterally, no crackles, rales, rhonchi, or wheezing  GI: +BS, abdomen nondistended, soft, +tenderness in RLQ, +Lorenzana's sign  NEURO: alert and oriented x 3, normal speech, PERRLA, EOMI, no focal motor or sensory deficits, nonantalgic gait  MSK: no visible deformities, no peripheral edema, calf tenderness/redness/swelling  SKIN: no jaundice, visible rashes, dry, well-perfused  PSYCH: appropriate mood and affect

## 2019-07-14 NOTE — H&P ADULT - NSHPPHYSICALEXAM_GEN_ALL_CORE
Vital Signs Last 24 Hrs  T(C): 36.7 (14 Jul 2019 23:28), Max: 36.8 (14 Jul 2019 16:41)  T(F): 98 (14 Jul 2019 23:28), Max: 98.3 (14 Jul 2019 16:41)  HR: 78 (14 Jul 2019 23:28) (78 - 91)  BP: 157/91 (14 Jul 2019 23:28) (139/82 - 157/91)  BP(mean): --  RR: 16 (14 Jul 2019 23:28) (16 - 20)  SpO2: 99% (14 Jul 2019 23:28) (98% - 100%)    General: A&Ox3, NAD.  Neuro: Motor and sensory grossly intact with no focal deficits.  HEENT: Anicteric sclerae.  Respiratory: Unlabored breathing.   CVS: Regular rate and rhythm.  Abdomen: Soft, non-distended, non-tender. No Lorenzana's sign.  Extremities: Warm bilaterally w/ palpable pulses.   MSK: Intact ROM.

## 2019-07-14 NOTE — ED ADULT NURSE NOTE - OBJECTIVE STATEMENT
pt 33 y/o female presents to ED complaining of right sided abd pain radiating to the right back. Pt states she was here on July 5th and was told she has gallstones and to follow up with GI Doc. PM HTN.  Pt saw GI David on 6th and they sent her for CT scan Tuesday. Pt read report herself and it was told she had gallstones. GI yet to follow up. Pt went to urgent care today for pain mgt and received 2 tylenol and IM toradol as per patient. Pt reports pain 7/10, worsening after meals. Pt attempted to eat broth and was unable to tolerate. Pt reports feeling very dehydrated but is very cooperative. Abd tender RUQ and RLQ. Pt denies diarrhea, HA, vision changes, fevers, chills, SOB, Chest pain. pt 33 y/o female presents to ED complaining of right sided abd pain radiating to the right back. Pt states she was here on July 5th and was told she has gallstones and to follow up with GI Doc. PM HTN.  Pt saw GI David on 6th and they sent her for CT scan Tuesday. Pt read report herself and it was told she had gallstones. GI yet to follow up. Pt went to urgent care today for pain mgt and received 2 Tylenol and IM Toradol as per patient. Pt reports pain 7/10, worsening after meals. Pt attempted to eat broth and was unable to tolerate. Pt reports nausea after meal and vomited instantly. Denies nausea at this time. Pt reports feeling very dehydrated but is very cooperative. Abd tender RUQ and RLQ. Pt denies diarrhea, HA, vision changes, fevers, chills, SOB, Chest pain.

## 2019-07-14 NOTE — ED PROVIDER NOTE - NS ED ROS FT
GENERAL: denies fever, chills  HEENT: denies cough, congestion, odynophagia, dysphagia  CARDIAC: no chest pain, palpitations, lightheadedness  PULM: no dyspnea, wheezing   GI: +abdominal pain, nausea, vomiting, denies diarrhea, constipation, melena, hematochezia  : no urinary dysuria, frequency, incontinence, hematuria  NEURO: no headache, motor weakness, sensory changes  MSK: no joint or muscle pain  SKIN: no rashes, hives  HEME: no active bleeding, bruising

## 2019-07-15 LAB
ALBUMIN SERPL ELPH-MCNC: 4 G/DL — SIGNIFICANT CHANGE UP (ref 3.3–5)
ALP SERPL-CCNC: 210 U/L — HIGH (ref 40–120)
ALT FLD-CCNC: 453 U/L — HIGH (ref 10–45)
ANION GAP SERPL CALC-SCNC: 14 MMOL/L — SIGNIFICANT CHANGE UP (ref 5–17)
AST SERPL-CCNC: 400 U/L — HIGH (ref 10–40)
BILIRUB DIRECT SERPL-MCNC: 2.4 MG/DL — HIGH (ref 0–0.2)
BILIRUB INDIRECT FLD-MCNC: 1.1 MG/DL — HIGH (ref 0.2–1)
BILIRUB SERPL-MCNC: 3.5 MG/DL — HIGH (ref 0.2–1.2)
BUN SERPL-MCNC: 11 MG/DL — SIGNIFICANT CHANGE UP (ref 7–23)
CALCIUM SERPL-MCNC: 8.7 MG/DL — SIGNIFICANT CHANGE UP (ref 8.4–10.5)
CHLORIDE SERPL-SCNC: 101 MMOL/L — SIGNIFICANT CHANGE UP (ref 96–108)
CO2 SERPL-SCNC: 25 MMOL/L — SIGNIFICANT CHANGE UP (ref 22–31)
CREAT SERPL-MCNC: 0.67 MG/DL — SIGNIFICANT CHANGE UP (ref 0.5–1.3)
GLUCOSE SERPL-MCNC: 87 MG/DL — SIGNIFICANT CHANGE UP (ref 70–99)
HCT VFR BLD CALC: 37.8 % — SIGNIFICANT CHANGE UP (ref 34.5–45)
HGB BLD-MCNC: 11.9 G/DL — SIGNIFICANT CHANGE UP (ref 11.5–15.5)
MAGNESIUM SERPL-MCNC: 1.9 MG/DL — SIGNIFICANT CHANGE UP (ref 1.6–2.6)
MCHC RBC-ENTMCNC: 26.3 PG — LOW (ref 27–34)
MCHC RBC-ENTMCNC: 31.5 GM/DL — LOW (ref 32–36)
MCV RBC AUTO: 83.6 FL — SIGNIFICANT CHANGE UP (ref 80–100)
PHOSPHATE SERPL-MCNC: 3.3 MG/DL — SIGNIFICANT CHANGE UP (ref 2.5–4.5)
PLATELET # BLD AUTO: 273 K/UL — SIGNIFICANT CHANGE UP (ref 150–400)
POTASSIUM SERPL-MCNC: 3.7 MMOL/L — SIGNIFICANT CHANGE UP (ref 3.5–5.3)
POTASSIUM SERPL-SCNC: 3.7 MMOL/L — SIGNIFICANT CHANGE UP (ref 3.5–5.3)
PROT SERPL-MCNC: 7 G/DL — SIGNIFICANT CHANGE UP (ref 6–8.3)
RBC # BLD: 4.52 M/UL — SIGNIFICANT CHANGE UP (ref 3.8–5.2)
RBC # FLD: 13 % — SIGNIFICANT CHANGE UP (ref 10.3–14.5)
SODIUM SERPL-SCNC: 140 MMOL/L — SIGNIFICANT CHANGE UP (ref 135–145)
WBC # BLD: 6.52 K/UL — SIGNIFICANT CHANGE UP (ref 3.8–10.5)
WBC # FLD AUTO: 6.52 K/UL — SIGNIFICANT CHANGE UP (ref 3.8–10.5)

## 2019-07-15 PROCEDURE — 74183 MRI ABD W/O CNTR FLWD CNTR: CPT | Mod: 26

## 2019-07-15 PROCEDURE — 99253 IP/OBS CNSLTJ NEW/EST LOW 45: CPT | Mod: GC

## 2019-07-15 RX ORDER — POTASSIUM CHLORIDE 20 MEQ
10 PACKET (EA) ORAL
Refills: 0 | Status: COMPLETED | OUTPATIENT
Start: 2019-07-15 | End: 2019-07-15

## 2019-07-15 RX ORDER — MAGNESIUM SULFATE 500 MG/ML
2 VIAL (ML) INJECTION ONCE
Refills: 0 | Status: COMPLETED | OUTPATIENT
Start: 2019-07-15 | End: 2019-07-15

## 2019-07-15 RX ORDER — DEXTROSE MONOHYDRATE, SODIUM CHLORIDE, AND POTASSIUM CHLORIDE 50; .745; 4.5 G/1000ML; G/1000ML; G/1000ML
1000 INJECTION, SOLUTION INTRAVENOUS
Refills: 0 | Status: DISCONTINUED | OUTPATIENT
Start: 2019-07-15 | End: 2019-07-17

## 2019-07-15 RX ORDER — ONDANSETRON 8 MG/1
4 TABLET, FILM COATED ORAL ONCE
Refills: 0 | Status: COMPLETED | OUTPATIENT
Start: 2019-07-15 | End: 2019-07-15

## 2019-07-15 RX ADMIN — Medication 100 MILLIEQUIVALENT(S): at 12:55

## 2019-07-15 RX ADMIN — Medication 100 MILLIEQUIVALENT(S): at 09:00

## 2019-07-15 RX ADMIN — ENOXAPARIN SODIUM 40 MILLIGRAM(S): 100 INJECTION SUBCUTANEOUS at 00:24

## 2019-07-15 RX ADMIN — Medication 100 MILLIEQUIVALENT(S): at 11:15

## 2019-07-15 RX ADMIN — SODIUM CHLORIDE 125 MILLILITER(S): 9 INJECTION, SOLUTION INTRAVENOUS at 00:24

## 2019-07-15 RX ADMIN — SODIUM CHLORIDE 125 MILLILITER(S): 9 INJECTION, SOLUTION INTRAVENOUS at 09:01

## 2019-07-15 RX ADMIN — Medication 50 GRAM(S): at 09:00

## 2019-07-15 NOTE — PROGRESS NOTE ADULT - ASSESSMENT
Assessment/Plan: 34y Female presents with RUQ pain and elevated bilirubin, likely choledocholithiasis.   Pain now resolved, no abdominal tenderness.   Tb 2.3, alk phos 196. Has known gallstones, and a CBD that measures 4mm. No signs of cholecystitis.     - MRCP today to evaluate ducts  - F/u GI pending MRCP  - NPO, IV hydration  - DVT ppx  - AM labs    ACS [id number]

## 2019-07-15 NOTE — CONSULT NOTE ADULT - SUBJECTIVE AND OBJECTIVE BOX
Chief Complaint:  Patient is a 34y old  Female who presents with a chief complaint of abdominal pain with vomiting (15 Jul 2019 07:50)      HPI:  34 year old woman with known history of gallstones presented to the emergency room with one day of severe RUQ pain associated with 2 episodes of vomiting.     Patient was last seen in the ED 9 days ago and was diagnosed with biliary colic. She developed severe RUQ abdominal pain that radiated to the flank and back today. She went to Urgent Care first and was given toradol, and was then advised to go to the ED if the pain worsened.     Of note, she has no prior abdominal surgeries and had a vaginal delivery approximately 1 year ago. She was started on labetalol after her pregnancy due to pre-eclampsia and has been slowly titrating down on the dose.    Allergies:  No Known Allergies      Home Medications:  No Current Medications as of 2019 23:47 documented in Structured Notes    Hospital Medications:  enoxaparin Injectable 40 milliGRAM(s) SubCutaneous every 24 hours  lactated ringers. 1000 milliLiter(s) IV Continuous <Continuous>  ondansetron    Tablet 4 milliGRAM(s) Oral once  potassium chloride  10 mEq/100 mL IVPB 10 milliEquivalent(s) IV Intermittent every 1 hour      PMHX/PSHX:  Gall stones  Hypertension  Gestational diabetes  No significant past surgical history      Family history:      Social History:   Lives with  and 14 month old    ROS:     General:  No wt loss, fevers, chills, night sweats, fatigue,   Eyes:  Good vision, no reported pain  ENT:  No sore throat, pain, runny nose, dysphagia  CV:  No pain, palpitations, hypo/hypertension  Resp:  No dyspnea, cough, tachypnea, wheezing  GI:  See HPI  :  No pain, bleeding, incontinence, nocturia  Muscle:  No pain, weakness  Neuro:  No weakness, tingling, memory problems  Psych:  No fatigue, insomnia, mood problems, depression  Endocrine:  No polyuria, polydipsia, cold/heat intolerance  Heme:  No petechiae, ecchymosis, easy bruisability  Skin:  No rash, edema      PHYSICAL EXAM:     GENERAL:  Appears stated age, well-groomed, well-nourished, no distress  HEENT:  NC/AT,  conjunctivae clear and pink,  no JVD  CHEST:  Full & symmetric excursion, no increased effort, breath sounds clear  HEART:  Regular rhythm, S1, S2, no murmur/rub/S3/S4, no abdominal bruit, no edema  ABDOMEN:  Soft, non-tender, non-distended, normoactive bowel sounds,  no masses ,  EXTREMITIES:  no cyanosis,clubbing or edema  SKIN:  No rash/erythema/ecchymoses/petechiae/wounds/abscess/warm/dry  NEURO:  Alert, oriented    Vital Signs:  Vital Signs Last 24 Hrs  T(C): 36.8 (15 Jul 2019 09:11), Max: 36.9 (15 Jul 2019 00:47)  T(F): 98.2 (15 Jul 2019 09:11), Max: 98.4 (15 Jul 2019 00:47)  HR: 72 (15 Jul 2019 09:11) (72 - 92)  BP: 163/96 (15 Jul 2019 09:11) (139/82 - 163/96)  BP(mean): --  RR: 17 (15 Jul 2019 09:11) (16 - 20)  SpO2: 99% (15 Jul 2019 09:11) (96% - 100%)  Daily Height in cm: 172.72 (2019 16:41)    Daily     LABS:                        11.9   6.52  )-----------( 273      ( 15 Jul 2019 09:42 )             37.8     07-15    140  |  101  |  11  ----------------------------<  87  3.7   |  25  |  0.67    Ca    8.7      15 Jul 2019 07:11  Phos  3.3     07-15  Mg     1.9     -15    TPro  7.0  /  Alb  4.0  /  TBili  3.5<H>  /  DBili  2.4<H>  /  AST  400<H>  /  ALT  453<H>  /  AlkPhos  210<H>  07-15    LIVER FUNCTIONS - ( 15 Jul 2019 07:11 )  Alb: 4.0 g/dL / Pro: 7.0 g/dL / ALK PHOS: 210 U/L / ALT: 453 U/L / AST: 400 U/L / GGT: x             Urinalysis Basic - ( 2019 19:37 )    Color: Yellow / Appearance: Clear / S.033 / pH: x  Gluc: x / Ketone: Small  / Bili: Small / Urobili: 3 mg/dL   Blood: x / Protein: 30 mg/dL / Nitrite: Negative   Leuk Esterase: Negative / RBC: 142 /hpf / WBC 7 /HPF   Sq Epi: x / Non Sq Epi: 3 /hpf / Bacteria: Negative      Amylase Serum--      Lipase serum28       Ammonia--      Imaging:      < from: US Abdomen Complete (19 @ 22:41) >  EXAM:  US ABDOMEN COMPLETE                        PROCEDURE DATE:  2019    INTERPRETATION:  CLINICAL INFORMATION: Right upper quadrant pain over 10 days.  COMPARISON: Abdominal ultrasound from 2019.  TECHNIQUE: Sonography of the abdomen.   FINDINGS:  Liver: Echogenic hepatic parenchyma.  Bile ducts: Normal caliber. Common bile duct measures 4 mm.   Gallbladder: Cholelithiasis. No wall thickening. No pericholecystic fluid. Negative Lorenzana sign. Patient received pain medication prior to   study.    Pancreas: Visualized portions are within normal limits.    Spleen: 9.3 cm. Within normal limits.    Right kidney: 12.1 cm. No hydronephrosis.    Left kidney: 14.2 cm.  No hydronephrosis.    Ascites: None.    Aorta and IVC: Visualized portions are within normal limits.    IMPRESSION:     Hepatic steatosis.  Cholelithiasis. No sonographic evidence of acute cholecystitis.    < end of copied text > Chief Complaint:  Patient is a 34y old  Female who presents with a chief complaint of abdominal pain with vomiting (15 Jul 2019 07:50)      HPI:  34 year old woman with known history of gallstones presented to the emergency room with one day of severe RUQ pain associated with 2 episodes of vomiting.     Patient was last seen in the ED 9 days ago and was diagnosed with biliary colic. She developed severe RUQ abdominal pain that radiated to the flank and back today. She went to Urgent Care first and was given toradol, and was then advised to go to the ED if the pain worsened.     Of note, she has no prior abdominal surgeries and had a vaginal delivery approximately 1 year ago. She was started on labetalol after her pregnancy due to pre-eclampsia and has been slowly titrating down on the dose.    Allergies:  No Known Allergies      Home Medications:  No Current Medications as of 2019 23:47 documented in Structured Notes    Hospital Medications:  enoxaparin Injectable 40 milliGRAM(s) SubCutaneous every 24 hours  lactated ringers. 1000 milliLiter(s) IV Continuous <Continuous>  ondansetron    Tablet 4 milliGRAM(s) Oral once  potassium chloride  10 mEq/100 mL IVPB 10 milliEquivalent(s) IV Intermittent every 1 hour      PMHX/PSHX:  Gall stones  Hypertension  Gestational diabetes  No significant past surgical history      Family history:      Social History:   Lives with  and 14 month old    ROS:     General:  No wt loss, fevers, chills, night sweats, fatigue,   Eyes:  Good vision, no reported pain  ENT:  No sore throat, pain, runny nose, dysphagia  CV:  No pain, palpitations, hypo/hypertension  Resp:  No dyspnea, cough, tachypnea, wheezing  GI:  See HPI  :  No pain, bleeding, incontinence, nocturia  Muscle:  No pain, weakness  Neuro:  No weakness, tingling, memory problems  Psych:  No fatigue, insomnia, mood problems, depression  Endocrine:  No polyuria, polydipsia, cold/heat intolerance  Heme:  No petechiae, ecchymosis, easy bruisability  Skin:  No rash, edema      PHYSICAL EXAM:     GENERAL:  Appears stated age, well-groomed, well-nourished, no distress  HEENT:  NC/AT,  conjunctivae clear and pink,  no JVD  CHEST:  Full & symmetric excursion, no increased effort, breath sounds clear  HEART:  Regular rhythm, S1, S2, no murmur/rub/S3/S4, no abdominal bruit, no edema  ABDOMEN:  Soft, non-tender, non-distended, normoactive bowel sounds,  no masses ,  EXTREMITIES:  no cyanosis,clubbing or edema  SKIN:  No rash/erythema/ecchymoses/petechiae/wounds/abscess/warm/dry  NEURO:  Alert, oriented    Vital Signs:  Vital Signs Last 24 Hrs  T(C): 36.8 (15 Jul 2019 09:11), Max: 36.9 (15 Jul 2019 00:47)  T(F): 98.2 (15 Jul 2019 09:11), Max: 98.4 (15 Jul 2019 00:47)  HR: 72 (15 Jul 2019 09:11) (72 - 92)  BP: 163/96 (15 Jul 2019 09:11) (139/82 - 163/96)  BP(mean): --  RR: 17 (15 Jul 2019 09:11) (16 - 20)  SpO2: 99% (15 Jul 2019 09:11) (96% - 100%)  Daily Height in cm: 172.72 (2019 16:41)    Daily     LABS:                        11.9   6.52  )-----------( 273      ( 15 Jul 2019 09:42 )             37.8     07-15    140  |  101  |  11  ----------------------------<  87  3.7   |  25  |  0.67    Ca    8.7      15 Jul 2019 07:11  Phos  3.3     07-15  Mg     1.9     -15    TPro  7.0  /  Alb  4.0  /  TBili  3.5<H>  /  DBili  2.4<H>  /  AST  400<H>  /  ALT  453<H>  /  AlkPhos  210<H>  07-15    LIVER FUNCTIONS - ( 15 Jul 2019 07:11 )  Alb: 4.0 g/dL / Pro: 7.0 g/dL / ALK PHOS: 210 U/L / ALT: 453 U/L / AST: 400 U/L / GGT: x             Urinalysis Basic - ( 2019 19:37 )    Color: Yellow / Appearance: Clear / S.033 / pH: x  Gluc: x / Ketone: Small  / Bili: Small / Urobili: 3 mg/dL   Blood: x / Protein: 30 mg/dL / Nitrite: Negative   Leuk Esterase: Negative / RBC: 142 /hpf / WBC 7 /HPF   Sq Epi: x / Non Sq Epi: 3 /hpf / Bacteria: Negative      Amylase Serum--      Lipase serum28       Ammonia--      Imaging:      < from: US Abdomen Complete (19 @ 22:41) >  EXAM:  US ABDOMEN COMPLETE                        PROCEDURE DATE:  2019    INTERPRETATION:  CLINICAL INFORMATION: Right upper quadrant pain over 10 days.  COMPARISON: Abdominal ultrasound from 2019.  TECHNIQUE: Sonography of the abdomen.   FINDINGS:  Liver: Echogenic hepatic parenchyma.  Bile ducts: Normal caliber. Common bile duct measures 4 mm.   Gallbladder: Cholelithiasis. No wall thickening. No pericholecystic fluid. Negative Lorenzana sign. Patient received pain medication prior to   study.    Pancreas: Visualized portions are within normal limits.  Spleen: 9.3 cm. Within normal limits.  Right kidney: 12.1 cm. No hydronephrosis.  Left kidney: 14.2 cm.  No hydronephrosis.  Ascites: None.  Aorta and IVC: Visualized portions are within normal limits.  IMPRESSION:   Hepatic steatosis.  Cholelithiasis. No sonographic evidence of acute cholecystitis.  < end of copied text >

## 2019-07-15 NOTE — PROVIDER CONTACT NOTE (OTHER) - BACKGROUND
pt admitted with cholethiasis, pt has history of hypertensive and patient takes Labetalol at home for hypertension

## 2019-07-15 NOTE — CONSULT NOTE ADULT - ATTENDING COMMENTS
As above.    Pt seen/examined on 7/15/19     Impression:  RUQ pain  Jaundice  Cholelithiasis on ultrasound  Choledocholithiasis on MRCP    Recommendations  Follow CBC/LFTs  NPO after MN for ERCP on 7/16/19  Antibiotics to cover biliary organisms (gram negative rods)

## 2019-07-15 NOTE — PROGRESS NOTE ADULT - SUBJECTIVE AND OBJECTIVE BOX
DAILY PROGRESS NOTE      SUBJECTIVE: Pt seen and examined at bedside.  Patient states abd pain improving.  C/o nausea around 2am after BP med given, which resolved, denies emesis, fever, chills.      OBJECTIVE:  Vital Signs Last 24 Hrs  T(C): 36.6 (15 Jul 2019 05:05), Max: 36.9 (15 Jul 2019 00:47)  T(F): 97.9 (15 Jul 2019 05:05), Max: 98.4 (15 Jul 2019 00:47)  HR: 86 (15 Jul 2019 05:05) (78 - 92)  BP: 147/84 (15 Jul 2019 05:05) (139/82 - 160/91)  BP(mean): --  RR: 18 (15 Jul 2019 05:05) (16 - 20)  SpO2: 97% (15 Jul 2019 05:05) (96% - 100%)    I&O's Summary    2019 07:01  -  15 Jul 2019 07:00  --------------------------------------------------------  IN: 750 mL / OUT: 0 mL / NET: 750 mL        Physical Exam:  General: A&Ox3, NAD.  Neuro: Motor and sensory grossly intact with no focal deficits.  HEENT: Anicteric sclerae.  Respiratory: Unlabored breathing.   CVS: Regular rate and rhythm.  Abdomen: Soft, non-distended, non-tender. No Lorenzana's sign.  Extremities: Warm bilaterally w/ palpable pulses.       LABS:                        14.0   11.4  )-----------( 313      ( 2019 18:47 )             39.7     07-15    140  |  101  |  11  ----------------------------<  87  3.7   |  25  |  0.67    Ca    8.7      15 Jul 2019 07:11  Phos  3.3     07-15  Mg     1.9     07-15    TPro  7.0  /  Alb  4.0  /  TBili  3.5<H>  /  DBili  2.4<H>  /  AST  400<H>  /  ALT  453<H>  /  AlkPhos  210<H>  07-15      Urinalysis Basic - ( 2019 19:37 )    Color: Yellow / Appearance: Clear / S.033 / pH: x  Gluc: x / Ketone: Small  / Bili: Small / Urobili: 3 mg/dL   Blood: x / Protein: 30 mg/dL / Nitrite: Negative   Leuk Esterase: Negative / RBC: 142 /hpf / WBC 7 /HPF   Sq Epi: x / Non Sq Epi: 3 /hpf / Bacteria: Negative        RADIOLOGY & ADDITIONAL STUDIES:

## 2019-07-15 NOTE — CONSULT NOTE ADULT - ASSESSMENT
34 year old woman with known history of gallstones presented to the emergency room with one day of severe RUQ pain associated with 2 episodes of vomiting.     IMPRESSION  - Abdominal pain with elevated liver enzymes (Tbili 3.5, , , Alk phos 210), US with CBD 4mm, concern for choledocholithiasis, no signs of cholangitis   - Gallstones     RECOMMENDATION    - trend CMP, INR  - recommend MRI with MRCP to evaluate the biliary tree  - if choledocholithiasis+, will plan for ERCP tomorrow  - keep NPO after midnight   - supportive care as per primary team    Tamie Ortega, PGY-6  GI fellow  B- [phone]/ [phone]  Please call GI fellow on call after 5pm and on weekends

## 2019-07-16 ENCOUNTER — TRANSCRIPTION ENCOUNTER (OUTPATIENT)
Age: 34
End: 2019-07-16

## 2019-07-16 LAB
ALBUMIN SERPL ELPH-MCNC: 4.3 G/DL — SIGNIFICANT CHANGE UP (ref 3.3–5)
ALP SERPL-CCNC: 236 U/L — HIGH (ref 40–120)
ALT FLD-CCNC: 559 U/L — HIGH (ref 10–45)
ANION GAP SERPL CALC-SCNC: 11 MMOL/L — SIGNIFICANT CHANGE UP (ref 5–17)
APTT BLD: 32.1 SEC — SIGNIFICANT CHANGE UP (ref 27.5–36.3)
AST SERPL-CCNC: 241 U/L — HIGH (ref 10–40)
BILIRUB DIRECT SERPL-MCNC: 0.9 MG/DL — HIGH (ref 0–0.2)
BILIRUB INDIRECT FLD-MCNC: 0.9 MG/DL — SIGNIFICANT CHANGE UP (ref 0.2–1)
BILIRUB SERPL-MCNC: 1.8 MG/DL — HIGH (ref 0.2–1.2)
BUN SERPL-MCNC: 6 MG/DL — LOW (ref 7–23)
CALCIUM SERPL-MCNC: 9.1 MG/DL — SIGNIFICANT CHANGE UP (ref 8.4–10.5)
CHLORIDE SERPL-SCNC: 104 MMOL/L — SIGNIFICANT CHANGE UP (ref 96–108)
CO2 SERPL-SCNC: 24 MMOL/L — SIGNIFICANT CHANGE UP (ref 22–31)
CREAT SERPL-MCNC: 0.65 MG/DL — SIGNIFICANT CHANGE UP (ref 0.5–1.3)
GLUCOSE SERPL-MCNC: 100 MG/DL — HIGH (ref 70–99)
HCG SERPL-ACNC: <2 MIU/ML — SIGNIFICANT CHANGE UP
HCT VFR BLD CALC: 40.2 % — SIGNIFICANT CHANGE UP (ref 34.5–45)
HGB BLD-MCNC: 12.4 G/DL — SIGNIFICANT CHANGE UP (ref 11.5–15.5)
INR BLD: 1.05 RATIO — SIGNIFICANT CHANGE UP (ref 0.88–1.16)
LIDOCAIN IGE QN: 24 U/L — SIGNIFICANT CHANGE UP (ref 7–60)
MAGNESIUM SERPL-MCNC: 2.1 MG/DL — SIGNIFICANT CHANGE UP (ref 1.6–2.6)
MCHC RBC-ENTMCNC: 26.4 PG — LOW (ref 27–34)
MCHC RBC-ENTMCNC: 30.8 GM/DL — LOW (ref 32–36)
MCV RBC AUTO: 85.5 FL — SIGNIFICANT CHANGE UP (ref 80–100)
PHOSPHATE SERPL-MCNC: 3 MG/DL — SIGNIFICANT CHANGE UP (ref 2.5–4.5)
PLATELET # BLD AUTO: 309 K/UL — SIGNIFICANT CHANGE UP (ref 150–400)
POTASSIUM SERPL-MCNC: 4 MMOL/L — SIGNIFICANT CHANGE UP (ref 3.5–5.3)
POTASSIUM SERPL-SCNC: 4 MMOL/L — SIGNIFICANT CHANGE UP (ref 3.5–5.3)
PROT SERPL-MCNC: 7.2 G/DL — SIGNIFICANT CHANGE UP (ref 6–8.3)
PROTHROM AB SERPL-ACNC: 11.9 SEC — SIGNIFICANT CHANGE UP (ref 10–13.1)
RBC # BLD: 4.7 M/UL — SIGNIFICANT CHANGE UP (ref 3.8–5.2)
RBC # FLD: 13.2 % — SIGNIFICANT CHANGE UP (ref 10.3–14.5)
SODIUM SERPL-SCNC: 139 MMOL/L — SIGNIFICANT CHANGE UP (ref 135–145)
WBC # BLD: 6.74 K/UL — SIGNIFICANT CHANGE UP (ref 3.8–10.5)
WBC # FLD AUTO: 6.74 K/UL — SIGNIFICANT CHANGE UP (ref 3.8–10.5)

## 2019-07-16 PROCEDURE — 43264 ERCP REMOVE DUCT CALCULI: CPT | Mod: GC

## 2019-07-16 PROCEDURE — 43262 ENDO CHOLANGIOPANCREATOGRAPH: CPT | Mod: GC

## 2019-07-16 PROCEDURE — 74328 X-RAY BILE DUCT ENDOSCOPY: CPT | Mod: 26,GC

## 2019-07-16 RX ORDER — LABETALOL HCL 100 MG
600 TABLET ORAL EVERY 8 HOURS
Refills: 0 | Status: DISCONTINUED | OUTPATIENT
Start: 2019-07-16 | End: 2019-07-17

## 2019-07-16 RX ADMIN — ENOXAPARIN SODIUM 40 MILLIGRAM(S): 100 INJECTION SUBCUTANEOUS at 23:03

## 2019-07-16 RX ADMIN — DEXTROSE MONOHYDRATE, SODIUM CHLORIDE, AND POTASSIUM CHLORIDE 125 MILLILITER(S): 50; .745; 4.5 INJECTION, SOLUTION INTRAVENOUS at 22:23

## 2019-07-16 RX ADMIN — Medication 600 MILLIGRAM(S): at 22:20

## 2019-07-16 NOTE — PROGRESS NOTE ADULT - ASSESSMENT
33 yo F with choledocholithias   - patients MRCP is positive   - ERCP 7/16  - consented for lap evita 7/17  - pain control   - OOB   - monitor BP

## 2019-07-16 NOTE — CHART NOTE - NSCHARTNOTEFT_GEN_A_CORE
ACS Surgery Pre-Op Note    Pre-Op Diagnosis:  Acute Cholecystitis   Procedure: Laparoscopic Cholecystectomy, possible open     Lab Findings:    CBC Full  -  ( 16 Jul 2019 09:53 )  WBC Count : 6.74 K/uL  RBC Count : 4.70 M/uL  Hemoglobin : 12.4 g/dL  Hematocrit : 40.2 %  Platelet Count - Automated : 309 K/uL  Mean Cell Volume : 85.5 fl  Mean Cell Hemoglobin : 26.4 pg  Mean Cell Hemoglobin Concentration : 30.8 gm/dL       07-16    139  |  104  |  6<L>  ----------------------------<  100<H>  4.0   |  24  |  0.65    Ca    9.1      16 Jul 2019 07:09  Phos  3.0     07-16  Mg     2.1     07-16      TPro  7.2  /  Alb  4.3  /  TBili  1.8<H>  /  DBili  0.9<H>  /  AST  241<H>  /  ALT  559<H>  /  AlkPhos  236<H>  07-16   PT/INR - ( 16 Jul 2019 10:06 )   PT: 11.9 sec;   INR: 1.05 ratio    PTT - ( 16 Jul 2019 10:06 )  PTT:32.1 sec LIVER FUNCTIONS - ( 16 Jul 2019 07:09 )  Alb: 4.3 g/dL / Pro: 7.2 g/dL / ALK PHOS: 236 U/L / ALT: 559 U/L / AST: 241 U/L / GGT: x         Magnesium, Serum: 2.1 mg/dL [1.6 - 2.6] (07-16-19 @ 07:09)                      07-16    139  |  104  |  6<L>  ----------------------------<  100<H>  4.0   |  24  |  0.65    Ca    9.1      16 Jul 2019 07:09  Phos  3.0     07-16  Mg     2.1     07-16      Type and Screen: Type + Screen (07.14.19 @ 21:11)    ABO Interpretation: A    Rh Interpretation: Positive    Antibody Screen: Negative      Vital Signs Last 24 Hrs:   T(C): 36.8 (16 Jul 2019 13:24), Max: 36.8 (15 Jul 2019 17:42)  T(F): 98.3 (16 Jul 2019 13:24), Max: 98.3 (15 Jul 2019 17:42)  HR: 77 (16 Jul 2019 13:24) (72 - 93)  BP: 131/79 (16 Jul 2019 13:24) (126/77 - 166/109)  RR: 18 (16 Jul 2019 13:24) (17 - 18)  SpO2: 99% (16 Jul 2019 13:24) (97% - 99%) I&O's Detail      15 Jul 2019 07:01  -  16 Jul 2019 07:00:   --------------------------------------------------------  IN:    dextrose 5% + sodium chloride 0.45% with potassium chloride 20 mEq/L: 1500 mL    IV PiggyBack: 350 mL    lactated ringers.: 1500 mL  Total IN: 3350 mL  OUT:    Voided: 1500 mL  Total OUT: 1500 mL  Total NET: 1850 mL  16 Jul 2019 07:01  -  16 Jul 2019 14:38  --------------------------------------------------------  IN:  Total IN: 0 mL  OUT:  Total OUT: 0 mL  Total NET: 0 mL      EkG: QRS axis to [] ° and NSR at a rate of [] BPM. There was no atrial enlargement. There was no ventricular hypertrophy. There were no ST-T changes and all intervals were normal.    Urinalaysis: Urine Microscopic-Add On (NC) (07.14.19 @ 19:37)    Bacteria: Negative    Epithelial Cells: 3 /hpf    Red Blood Cell - Urine: 142 /hpf    White Blood Cell - Urine: 7 /HPF    Hyaline Casts: 3 /lpf    Urine pregnancy: pending     Orders: NPO pMN Allergies, No Known Allergies  Consent: Placed in chart by primary team residents

## 2019-07-16 NOTE — PROGRESS NOTE ADULT - SUBJECTIVE AND OBJECTIVE BOX
Trauma Surgery Progress Note    SUBJECTIVE/ROS: Patient examined at bed side. No acute events overnight.   Denies nausea, vomiting, chest pain, shortness of breath. Patient had increased blood pressure (126-166/) with a headache.   Patient took her home blood pressure medication labetalol 600mg this morning.      Physical Exam:  Gen: NAD, resting comfortably in bed  Resp: No increased WOB   Abd: nontender, nondistended, soft, compressible    Medications:  enoxaparin Injectable 40  labetalol 600      Objective:  Vital Signs Last 24 Hrs  T(C): 36.7 (2019 09:14), Max: 36.8 (15 Jul 2019 17:42)  T(F): 98.1 (2019 09:14), Max: 98.3 (15 Jul 2019 17:42)  HR: 83 (2019 09:14) (72 - 93)  BP: 135/89 (2019 09:14) (126/77 - 166/109)  BP(mean): --  RR: 18 (2019 09:14) (17 - 18)  SpO2: 98% (2019 09:14) (97% - 98%)    I&O's Summary    15 Jul 2019 07:  -  2019 07:00  --------------------------------------------------------  IN: 3350 mL / OUT: 1500 mL / NET: 1850 mL    2019 07:  -  2019 10:05  --------------------------------------------------------  IN: 0 mL / OUT: 0 mL / NET: 0 mL        LABS:                        11.9   6.52  )-----------( 273      ( 15 Jul 2019 09:42 )             37.8     07-16    139  |  104  |  6<L>  ----------------------------<  100<H>  4.0   |  24  |  0.65    Ca    9.1      2019 07:09  Phos  3.0     -  Mg     2.1     -    TPro  7.2  /  Alb  4.3  /  TBili  1.8<H>  /  DBili  0.9<H>  /  AST  241<H>  /  ALT  559<H>  /  AlkPhos  236<H>        Urinalysis Basic - ( 2019 19:37 )    Color: Yellow / Appearance: Clear / S.033 / pH: x  Gluc: x / Ketone: Small  / Bili: Small / Urobili: 3 mg/dL   Blood: x / Protein: 30 mg/dL / Nitrite: Negative   Leuk Esterase: Negative / RBC: 142 /hpf / WBC 7 /HPF   Sq Epi: x / Non Sq Epi: 3 /hpf / Bacteria: Negative        enoxaparin Injectable 40  labetalol 600      RADIOLOGY, EKG & ADDITIONAL TESTS: Reviewed.

## 2019-07-16 NOTE — PROGRESS NOTE ADULT - SUBJECTIVE AND OBJECTIVE BOX
Pre-Endoscopy Evaluation      Referring Physician:  dr. murillo                                   Procedure: ercp    Indication for Procedure: choledo    Pertinent History: 34 year old woman with known history of gallstones presented to the emergency room with one day of severe RUQ pain     Sedation by Anesthesia [X]    PAST MEDICAL & SURGICAL HISTORY:  Gall stones  Hypertension  Gestational diabetes  No significant past surgical history      PMH of Gastroparesis [ ]  Gastric Surgery [ ]  Gastric Outlet Obstruction [ ]    Allergies    No Known Allergies    Intolerances    Latex allergy: [ ] yes [X] no    Medications:MEDICATIONS  (STANDING):  dextrose 5% + sodium chloride 0.45% with potassium chloride 20 mEq/L 1000 milliLiter(s) (125 mL/Hr) IV Continuous <Continuous>  enoxaparin Injectable 40 milliGRAM(s) SubCutaneous every 24 hours  labetalol 600 milliGRAM(s) Oral every 8 hours    MEDICATIONS  (PRN):      Smoking: [ ] yes  [X] no    AICD/PPM: [ ] yes   [X] no    Pertinent lab data:                        12.4   6.74  )-----------( 309      ( 16 Jul 2019 09:53 )             40.2     07-16    139  |  104  |  6<L>  ----------------------------<  100<H>  4.0   |  24  |  0.65    Ca    9.1      16 Jul 2019 07:09  Phos  3.0     07-16  Mg     2.1     07-16    TPro  7.2  /  Alb  4.3  /  TBili  1.8<H>  /  DBili  0.9<H>  /  AST  241<H>  /  ALT  559<H>  /  AlkPhos  236<H>  07-16    PT/INR - ( 16 Jul 2019 10:06 )   PT: 11.9 sec;   INR: 1.05 ratio         PTT - ( 16 Jul 2019 10:06 )  PTT:32.1 sec        HCG Quantitative, Serum: <2.0 mIU/mL (07-16 @ 07:09)        Physical Examination:     Daily   Vital Signs Last 24 Hrs  T(C): 36.8 (16 Jul 2019 13:24), Max: 36.8 (15 Jul 2019 17:42)  T(F): 98.3 (16 Jul 2019 13:24), Max: 98.3 (15 Jul 2019 17:42)  HR: 77 (16 Jul 2019 13:24) (72 - 93)  BP: 131/79 (16 Jul 2019 13:24) (126/77 - 166/109)  BP(mean): --  RR: 18 (16 Jul 2019 13:24) (17 - 18)  SpO2: 99% (16 Jul 2019 13:24) (97% - 99%)    Drug Dosing Weight  Height (cm): 172.72 (14 Jul 2019 16:41)  Weight (kg): 103.9 (14 Jul 2019 16:41)  BMI (kg/m2): 34.8 (14 Jul 2019 16:41)  BSA (m2): 2.17 (14 Jul 2019 16:41)    Constitutional: NAD    Neck:  No JVD    Respiratory: CTAB/L    Cardiovascular: S1 and S2    Gastrointestinal: BS+, soft, NT/ND    Extremities: No peripheral edema    Neurological: A/O x 3    : No Riddle    Skin: No rashes    Comments:    ASA Class: I [ ]  II [ ]  III [ ]  IV [ ]    The patient is a suitable candidate for the planned procedure unless box checked [ ]  No, explain: Pre-Endoscopy Evaluation      Referring Physician:  dr. murillo                                   Procedure: ercp    Indication for Procedure: choledocholithiasis       Pertinent History: 34y female with known history of gallstones presented with severe RUQ pain found to have distal choledocholithiasis without biliary ductal dilatation on MRCP      Sedation by Anesthesia [X]    PAST MEDICAL & SURGICAL HISTORY:  Gall stones  Hypertension  Gestational diabetes  No significant past surgical history      PMH of Gastroparesis [ ]  Gastric Surgery [ ]  Gastric Outlet Obstruction [ ]    Allergies    No Known Allergies    Intolerances    Latex allergy: [ ] yes [X] no    Medications:MEDICATIONS  (STANDING):  dextrose 5% + sodium chloride 0.45% with potassium chloride 20 mEq/L 1000 milliLiter(s) (125 mL/Hr) IV Continuous <Continuous>  enoxaparin Injectable 40 milliGRAM(s) SubCutaneous every 24 hours  labetalol 600 milliGRAM(s) Oral every 8 hours    MEDICATIONS  (PRN):      Smoking: [ ] yes  [X] no    AICD/PPM: [ ] yes   [X] no    Pertinent lab data:                        12.4   6.74  )-----------( 309      ( 16 Jul 2019 09:53 )             40.2     07-16    139  |  104  |  6<L>  ----------------------------<  100<H>  4.0   |  24  |  0.65    Ca    9.1      16 Jul 2019 07:09  Phos  3.0     07-16  Mg     2.1     07-16    TPro  7.2  /  Alb  4.3  /  TBili  1.8<H>  /  DBili  0.9<H>  /  AST  241<H>  /  ALT  559<H>  /  AlkPhos  236<H>  07-16    PT/INR - ( 16 Jul 2019 10:06 )   PT: 11.9 sec;   INR: 1.05 ratio         PTT - ( 16 Jul 2019 10:06 )  PTT:32.1 sec        HCG Quantitative, Serum: <2.0 mIU/mL (07-16 @ 07:09)    ------------------------------------------------------------------------------    < from: MR MRCP w/wo IV Cont (07.15.19 @ 16:57) >    IMPRESSION:     Distal choledocholithiasis without biliary ductal dilatation.    Cholelithiasis.  -------------------------------------------------------------------------    Physical Examination:     Daily   Vital Signs Last 24 Hrs  T(C): 36.8 (16 Jul 2019 13:24), Max: 36.8 (15 Jul 2019 17:42)  T(F): 98.3 (16 Jul 2019 13:24), Max: 98.3 (15 Jul 2019 17:42)  HR: 77 (16 Jul 2019 13:24) (72 - 93)  BP: 131/79 (16 Jul 2019 13:24) (126/77 - 166/109)  BP(mean): --  RR: 18 (16 Jul 2019 13:24) (17 - 18)  SpO2: 99% (16 Jul 2019 13:24) (97% - 99%)    Drug Dosing Weight  Height (cm): 172.72 (14 Jul 2019 16:41)  Weight (kg): 103.9 (14 Jul 2019 16:41)  BMI (kg/m2): 34.8 (14 Jul 2019 16:41)  BSA (m2): 2.17 (14 Jul 2019 16:41)    Constitutional: NAD    Neck:  No JVD    Respiratory: CTAB/L    Cardiovascular: S1 and S2    Gastrointestinal: BS+, soft, NT/ND    Extremities: No peripheral edema    Neurological: A/O x 3    : No Riddle    Skin: No rashes    Comments:    ASA Class: I [ ]  II [ ]  III [ ]  IV [ ]    The patient is a suitable candidate for the planned procedure unless box checked [ ]  No, explain:

## 2019-07-17 ENCOUNTER — RESULT REVIEW (OUTPATIENT)
Age: 34
End: 2019-07-17

## 2019-07-17 LAB
ALBUMIN SERPL ELPH-MCNC: 4.8 G/DL — SIGNIFICANT CHANGE UP (ref 3.3–5)
ALP SERPL-CCNC: 231 U/L — HIGH (ref 40–120)
ALT FLD-CCNC: 509 U/L — HIGH (ref 10–45)
ANION GAP SERPL CALC-SCNC: 14 MMOL/L — SIGNIFICANT CHANGE UP (ref 5–17)
AST SERPL-CCNC: 134 U/L — HIGH (ref 10–40)
BILIRUB DIRECT SERPL-MCNC: 0.3 MG/DL — HIGH (ref 0–0.2)
BILIRUB INDIRECT FLD-MCNC: 0.6 MG/DL — SIGNIFICANT CHANGE UP (ref 0.2–1)
BILIRUB SERPL-MCNC: 0.9 MG/DL — SIGNIFICANT CHANGE UP (ref 0.2–1.2)
BLD GP AB SCN SERPL QL: NEGATIVE — SIGNIFICANT CHANGE UP
BUN SERPL-MCNC: 5 MG/DL — LOW (ref 7–23)
CALCIUM SERPL-MCNC: 9.7 MG/DL — SIGNIFICANT CHANGE UP (ref 8.4–10.5)
CHLORIDE SERPL-SCNC: 102 MMOL/L — SIGNIFICANT CHANGE UP (ref 96–108)
CO2 SERPL-SCNC: 22 MMOL/L — SIGNIFICANT CHANGE UP (ref 22–31)
CREAT SERPL-MCNC: 0.65 MG/DL — SIGNIFICANT CHANGE UP (ref 0.5–1.3)
GLUCOSE SERPL-MCNC: 139 MG/DL — HIGH (ref 70–99)
HCG SERPL-ACNC: <2 MIU/ML — SIGNIFICANT CHANGE UP
HCT VFR BLD CALC: 41.5 % — SIGNIFICANT CHANGE UP (ref 34.5–45)
HGB BLD-MCNC: 13.1 G/DL — SIGNIFICANT CHANGE UP (ref 11.5–15.5)
LIDOCAIN IGE QN: 19 U/L — SIGNIFICANT CHANGE UP (ref 7–60)
MAGNESIUM SERPL-MCNC: 2 MG/DL — SIGNIFICANT CHANGE UP (ref 1.6–2.6)
MCHC RBC-ENTMCNC: 26.5 PG — LOW (ref 27–34)
MCHC RBC-ENTMCNC: 31.6 GM/DL — LOW (ref 32–36)
MCV RBC AUTO: 83.8 FL — SIGNIFICANT CHANGE UP (ref 80–100)
PHOSPHATE SERPL-MCNC: 2.7 MG/DL — SIGNIFICANT CHANGE UP (ref 2.5–4.5)
PLATELET # BLD AUTO: 335 K/UL — SIGNIFICANT CHANGE UP (ref 150–400)
POTASSIUM SERPL-MCNC: 4.2 MMOL/L — SIGNIFICANT CHANGE UP (ref 3.5–5.3)
POTASSIUM SERPL-SCNC: 4.2 MMOL/L — SIGNIFICANT CHANGE UP (ref 3.5–5.3)
PROT SERPL-MCNC: 7.9 G/DL — SIGNIFICANT CHANGE UP (ref 6–8.3)
RBC # BLD: 4.95 M/UL — SIGNIFICANT CHANGE UP (ref 3.8–5.2)
RBC # FLD: 13.1 % — SIGNIFICANT CHANGE UP (ref 10.3–14.5)
RH IG SCN BLD-IMP: POSITIVE — SIGNIFICANT CHANGE UP
SODIUM SERPL-SCNC: 138 MMOL/L — SIGNIFICANT CHANGE UP (ref 135–145)
WBC # BLD: 7.52 K/UL — SIGNIFICANT CHANGE UP (ref 3.8–10.5)
WBC # FLD AUTO: 7.52 K/UL — SIGNIFICANT CHANGE UP (ref 3.8–10.5)

## 2019-07-17 PROCEDURE — 99232 SBSQ HOSP IP/OBS MODERATE 35: CPT | Mod: GC

## 2019-07-17 PROCEDURE — 88304 TISSUE EXAM BY PATHOLOGIST: CPT | Mod: 26

## 2019-07-17 PROCEDURE — 47562 LAPAROSCOPIC CHOLECYSTECTOMY: CPT

## 2019-07-17 RX ORDER — CHLORHEXIDINE GLUCONATE 213 G/1000ML
1 SOLUTION TOPICAL DAILY
Refills: 0 | Status: DISCONTINUED | OUTPATIENT
Start: 2019-07-17 | End: 2019-07-17

## 2019-07-17 RX ORDER — ONDANSETRON 8 MG/1
4 TABLET, FILM COATED ORAL ONCE
Refills: 0 | Status: DISCONTINUED | OUTPATIENT
Start: 2019-07-17 | End: 2019-07-17

## 2019-07-17 RX ORDER — HYDROMORPHONE HYDROCHLORIDE 2 MG/ML
0.5 INJECTION INTRAMUSCULAR; INTRAVENOUS; SUBCUTANEOUS
Refills: 0 | Status: DISCONTINUED | OUTPATIENT
Start: 2019-07-17 | End: 2019-07-17

## 2019-07-17 RX ORDER — METOCLOPRAMIDE HCL 10 MG
10 TABLET ORAL ONCE
Refills: 0 | Status: DISCONTINUED | OUTPATIENT
Start: 2019-07-17 | End: 2019-07-17

## 2019-07-17 RX ORDER — SODIUM CHLORIDE 9 MG/ML
1000 INJECTION INTRAMUSCULAR; INTRAVENOUS; SUBCUTANEOUS
Refills: 0 | Status: DISCONTINUED | OUTPATIENT
Start: 2019-07-17 | End: 2019-07-18

## 2019-07-17 RX ORDER — HEPARIN SODIUM 5000 [USP'U]/ML
5000 INJECTION INTRAVENOUS; SUBCUTANEOUS EVERY 8 HOURS
Refills: 0 | Status: DISCONTINUED | OUTPATIENT
Start: 2019-07-17 | End: 2019-07-18

## 2019-07-17 RX ADMIN — Medication 600 MILLIGRAM(S): at 05:50

## 2019-07-17 RX ADMIN — CHLORHEXIDINE GLUCONATE 1 APPLICATION(S): 213 SOLUTION TOPICAL at 11:22

## 2019-07-17 RX ADMIN — Medication 600 MILLIGRAM(S): at 13:58

## 2019-07-17 NOTE — DIETITIAN INITIAL EVALUATION ADULT. - OTHER INFO
source: medical record, pt    Pt reports very good appetite and PO intake PTA. Pt reports recently starting to follow a ketogenic diet, with emphasis on healthy fats such as avocado and olive oil. Pt denies micronutrient supplementation PTA. NKFA.  Pt now NPO day #4 with choledocholithias. Plan for lap evita. Educated pt on diet progression per MD. Discussed foods recommended, foods to avoid and balanced meals.

## 2019-07-17 NOTE — DIETITIAN INITIAL EVALUATION ADULT. - SIGNS/SYMPTOMS
C/o vaginal bleeding since last night. I am 7 weeks pregnant, I was seen by the OB/GYN this morning and they told me I need rhogam. Pt NPO day #4, plan for lap evita

## 2019-07-17 NOTE — DIETITIAN INITIAL EVALUATION ADULT. - ENERGY NEEDS
Ht: 68 Wt: 229 pounds BMI: 34.8 kg/m2 IBW: 140  pounds(+/-10%)   No edema. No pressure ulcers documented.

## 2019-07-17 NOTE — BRIEF OPERATIVE NOTE - OPERATION/FINDINGS
Laparoscopic cholecystectomy. Artery clipped and large stone in the cystic duct was milked up into the gallbladder. Gallbladder endolooped and cystic duct entered due to persistent gallstones.
TELE

## 2019-07-17 NOTE — PROGRESS NOTE ADULT - SUBJECTIVE AND OBJECTIVE BOX
Trauma Surgery Progress Note    SUBJECTIVE/ROS: Patient examined at bed side. Patient was hypertensive over night to 173/97 patient was given labetalol and SBP returned to the 130s  Denies nausea, vomiting, chest pain, shortness of breath. Had ERCP yesterday.     Physical Exam:  Gen: NAD, resting comfortably in bed  Resp: No increased WOB   Abd: RUQ tenderness, nondistended, soft, compressible      Medications:  enoxaparin Injectable 40  labetalol 600      Objective:  Vital Signs Last 24 Hrs  T(C): 36.9 (17 Jul 2019 10:11), Max: 37 (16 Jul 2019 22:12)  T(F): 98.5 (17 Jul 2019 10:11), Max: 98.6 (16 Jul 2019 22:12)  HR: 82 (17 Jul 2019 10:11) (66 - 82)  BP: 115/80 (17 Jul 2019 10:11) (115/80 - 173/97)  BP(mean): --  RR: 18 (17 Jul 2019 10:11) (18 - 18)  SpO2: 96% (17 Jul 2019 10:11) (95% - 99%)    I&O's Summary    16 Jul 2019 07:01  -  17 Jul 2019 07:00  --------------------------------------------------------  IN: 3000 mL / OUT: 300 mL / NET: 2700 mL    17 Jul 2019 07:01  -  17 Jul 2019 12:49  --------------------------------------------------------  IN: 0 mL / OUT: 0 mL / NET: 0 mL        LABS:                        13.1   7.52  )-----------( 335      ( 17 Jul 2019 09:04 )             41.5     07-17    138  |  102  |  5<L>  ----------------------------<  139<H>  4.2   |  22  |  0.65    Ca    9.7      17 Jul 2019 07:04  Phos  2.7     07-17  Mg     2.0     07-17    TPro  7.9  /  Alb  4.8  /  TBili  0.9  /  DBili  0.3<H>  /  AST  134<H>  /  ALT  509<H>  /  AlkPhos  231<H>  07-17    PT/INR - ( 16 Jul 2019 10:06 )   PT: 11.9 sec;   INR: 1.05 ratio         PTT - ( 16 Jul 2019 10:06 )  PTT:32.1 sec      enoxaparin Injectable 40  labetalol 600      RADIOLOGY, EKG & ADDITIONAL TESTS: Reviewed.

## 2019-07-17 NOTE — PROGRESS NOTE ADULT - ASSESSMENT
33 yo F with choledocholithias   - patients MRCP is positive   - ERCP completed 7/17  - consented for lap evita 7/17  - pain control   - OOB   - monitor BP   - POC

## 2019-07-17 NOTE — PROGRESS NOTE ADULT - ASSESSMENT
34 year old woman with known history of gallstones presented to the emergency room with one day of severe RUQ pain associated with 2 episodes of vomiting.     IMPRESSION  - Choledocholithiasis status post ERCP on 7/16/2019 with biliary sphincterotomy, balloon sweep, and stone extraction  - Gallstones     RECOMMENDATION    - trend CMP, INR  - diet and plan for cholecystectomy as per surgery  - supportive care as per primary team    Please call us back as needed    Tamie Ortega, PGY-6  GI fellow  B- [id number]/ [phone]  Please call GI fellow on call after 5pm and on weekends

## 2019-07-17 NOTE — PROGRESS NOTE ADULT - SUBJECTIVE AND OBJECTIVE BOX
Chief Complaint:  Patient is a 34y old  Female who presents with a chief complaint of abdominal pain with vomiting (16 Jul 2019 15:40)      Interval Events:   - had ERCP yesterday with no issues    HPI:  34 year old woman with known history of gallstones presented to the emergency room with one day of severe RUQ pain associated with 2 episodes of vomiting.   Patient was last seen in the ED 9 days ago and was diagnosed with biliary colic. She developed severe RUQ abdominal pain that radiated to the flank and back today. She went to Urgent Care first and was given toradol, and was then advised to go to the ED if the pain worsened.   Of note, she has no prior abdominal surgeries and had a vaginal delivery approximately 1 year ago. She was started on labetalol after her pregnancy due to pre-eclampsia and has been slowly titrating down on the dose.        Allergies:  No Known Allergies      Hospital Medications:  chlorhexidine 2% Cloths 1 Application(s) Topical daily  dextrose 5% + sodium chloride 0.45% with potassium chloride 20 mEq/L 1000 milliLiter(s) IV Continuous <Continuous>  enoxaparin Injectable 40 milliGRAM(s) SubCutaneous every 24 hours  labetalol 600 milliGRAM(s) Oral every 8 hours      PMHX/PSHX:  Gall stones  Hypertension  Gestational diabetes  No significant past surgical history      Family history:      ROS:     General:  No wt loss, fevers, chills, night sweats, fatigue,   Eyes:  Good vision, no reported pain  ENT:  No sore throat, pain, runny nose, dysphagia  CV:  No pain, palpitations, hypo/hypertension  Resp:  No dyspnea, cough, tachypnea, wheezing  GI:  See HPI  :  No pain, bleeding, incontinence, nocturia  Muscle:  No pain, weakness  Neuro:  No weakness, tingling, memory problems  Psych:  No fatigue, insomnia, mood problems, depression  Endocrine:  No polyuria, polydipsia, cold/heat intolerance  Heme:  No petechiae, ecchymosis, easy bruisability  Skin:  No rash, edema      PHYSICAL EXAM:     GENERAL:  Appears stated age, well-groomed, well-nourished, no distress  HEENT:  NC/AT,  conjunctivae clear, sclera -anicteric  CHEST:  Full & symmetric excursion, no increased effort, breath sounds clear  HEART:  Regular rhythm, S1, S2, no murmur/rub/S3/S4,  no edema  ABDOMEN:  Soft, non-tender, non-distended, normoactive bowel sounds,  no masses ,no hepato-splenomegaly,   EXTREMITIES:  no cyanosis,clubbing or edema  SKIN:  No rash/erythema/ecchymoses/petechiae/wounds/abscess/warm/dry  NEURO:  Alert, oriented    Vital Signs:  Vital Signs Last 24 Hrs  T(C): 36.9 (17 Jul 2019 10:11), Max: 37 (16 Jul 2019 22:12)  T(F): 98.5 (17 Jul 2019 10:11), Max: 98.6 (16 Jul 2019 22:12)  HR: 82 (17 Jul 2019 10:11) (66 - 82)  BP: 115/80 (17 Jul 2019 10:11) (115/80 - 173/97)  BP(mean): --  RR: 18 (17 Jul 2019 10:11) (18 - 18)  SpO2: 96% (17 Jul 2019 10:11) (95% - 99%)  Daily     Daily     LABS:                        13.1   7.52  )-----------( 335      ( 17 Jul 2019 09:04 )             41.5     07-17    138  |  102  |  5<L>  ----------------------------<  139<H>  4.2   |  22  |  0.65    Ca    9.7      17 Jul 2019 07:04  Phos  2.7     07-17  Mg     2.0     07-17    TPro  7.9  /  Alb  4.8  /  TBili  0.9  /  DBili  0.3<H>  /  AST  134<H>  /  ALT  509<H>  /  AlkPhos  231<H>  07-17    LIVER FUNCTIONS - ( 17 Jul 2019 07:04 )  Alb: 4.8 g/dL / Pro: 7.9 g/dL / ALK PHOS: 231 U/L / ALT: 509 U/L / AST: 134 U/L / GGT: x           PT/INR - ( 16 Jul 2019 10:06 )   PT: 11.9 sec;   INR: 1.05 ratio         PTT - ( 16 Jul 2019 10:06 )  PTT:32.1 sec    Amylase Serum--      Lipase serum19       Ammonia--      Imaging:        < from: ERCP (07.16.19 @ 16:56) >  [facility]  ____________________________________________________________________________________________________  Patient Name: Lu George                     MRN: [id number]  Account Number: [id number]                     YOB: 1985  Room: Endoscopy Room 2                           Gender: Female  Attending MD: Killian Patterson MD                Procedure Date No Time: 7/16/2019  ____________________________________________________________________________________________________     Procedure:           ERCP  Indications:         For therapy of bile duct stone(s)  Providers:           Killian Patterson MD, Pj Roland (Fellow)  Referring MD:        Darvin Claros MD  Medicines:           General Anesthesia, Indomethacin 100 mg NM, Levaquin 500 mg IV, Fluoro: 1.2                        min  Complications:       No immediate complications.  ____________________________________________________________________________________________________  Procedure:           Pre-Anesthesia Assessment:                       - Prior to the procedure, a History and Physical was performed, and patient                        medications and allergies were reviewed. The risks and benefits of the     procedure and the sedation options and risks were discussed with the patient.                        All questions were answered and informed consent was obtained. Patient                        identification and proposed procedure were verified. After reviewing the                        risks and benefits, the patient was deemed in satisfactory condition to                        undergo the procedure. The anesthesia plan was to use general anesthesia.                        Immediately prior to administration of medications, the patient was                        re-assessed for adequacy to receive sedatives. The heart rate, respiratory                        rate, oxygen saturations, blood pressure, adequacy of pulmonary ventilation,                        and response to care were monitored throughout the procedure. The physical                        status of the patient was re-assessed after the procedure.                       After obtaining informed consent, the scope was passed under direct vision.                        Throughout the procedure, the patient's blood pressure, pulse, and oxygen                        saturations were monitored continuously. The Duodenoscope was introduced                        through the mouth, and advanced to the duodenum and used to inject contrast                        into the bile duct. The ERCP was accomplished without difficulty. The patient                        tolerated the procedure well.                                                                          Findings:       The  film was normal. The esophagus was successfully intubated under direct vision. The        scope was advanced to the major papilla in the descending duodenum without detailed        examination of the pharynx, larynx and associated structures, and upper GI tract. The upper        GI tract was grossly normal. The bile duct was deeply cannulated with the Jagtome RX 39 with        preloaded 0.025 in x 260 cm guidewire. Contrast was injected. The bile duct measured        approximately 10 mm and two filling defects consistent with stones were identified.        Gallstones were also visualized in the gallbladder neck. Biliary sphincterotomy was made with        the sphincterotome using ERBE electrocautery. There was no post-sphincterotomy bleeding. The        biliary tree was swept with an 8.5 mm balloon and an 11.5 mm balloon starting at the        bifurcation. Bile, sludge, and two stones were removed. Final occlusion cholangiogram        revealed no further filling defects in the bile duct.                                                                                                        Impression:          - Choledocholithiasis status post biliary sphincterotomy, balloon sweep, and                        stone extraction.  Recommendation:      - Return patient to [facility] care.                       - Monitor CBC, LFTs.                       - Timing of cholecystectomy as per Surgery.                                                                                                          < end of copied text >

## 2019-07-17 NOTE — PROVIDER CONTACT NOTE (OTHER) - ACTION/TREATMENT ORDERED:
MD aware of patients blood pressure as per MD no medications to be ordered at this time.
Patient /86 , patient lying down with no sign of chest pain, discomfort, trouble breathing, and pain. Patient call bell within reach, will continue to monitor.

## 2019-07-18 ENCOUNTER — TRANSCRIPTION ENCOUNTER (OUTPATIENT)
Age: 34
End: 2019-07-18

## 2019-07-18 VITALS
DIASTOLIC BLOOD PRESSURE: 74 MMHG | TEMPERATURE: 98 F | SYSTOLIC BLOOD PRESSURE: 130 MMHG | RESPIRATION RATE: 16 BRPM | OXYGEN SATURATION: 98 % | HEART RATE: 78 BPM

## 2019-07-18 LAB
ALBUMIN SERPL ELPH-MCNC: 4.1 G/DL — SIGNIFICANT CHANGE UP (ref 3.3–5)
ALP SERPL-CCNC: 181 U/L — HIGH (ref 40–120)
ALT FLD-CCNC: 432 U/L — HIGH (ref 10–45)
ANION GAP SERPL CALC-SCNC: 14 MMOL/L — SIGNIFICANT CHANGE UP (ref 5–17)
AST SERPL-CCNC: 118 U/L — HIGH (ref 10–40)
BILIRUB SERPL-MCNC: 0.7 MG/DL — SIGNIFICANT CHANGE UP (ref 0.2–1.2)
BUN SERPL-MCNC: 8 MG/DL — SIGNIFICANT CHANGE UP (ref 7–23)
CALCIUM SERPL-MCNC: 8.8 MG/DL — SIGNIFICANT CHANGE UP (ref 8.4–10.5)
CHLORIDE SERPL-SCNC: 101 MMOL/L — SIGNIFICANT CHANGE UP (ref 96–108)
CO2 SERPL-SCNC: 24 MMOL/L — SIGNIFICANT CHANGE UP (ref 22–31)
CREAT SERPL-MCNC: 0.75 MG/DL — SIGNIFICANT CHANGE UP (ref 0.5–1.3)
GLUCOSE SERPL-MCNC: 108 MG/DL — HIGH (ref 70–99)
HCT VFR BLD CALC: 38.4 % — SIGNIFICANT CHANGE UP (ref 34.5–45)
HGB BLD-MCNC: 12.1 G/DL — SIGNIFICANT CHANGE UP (ref 11.5–15.5)
LIDOCAIN IGE QN: 18 U/L — SIGNIFICANT CHANGE UP (ref 7–60)
MAGNESIUM SERPL-MCNC: 2 MG/DL — SIGNIFICANT CHANGE UP (ref 1.6–2.6)
MCHC RBC-ENTMCNC: 26.7 PG — LOW (ref 27–34)
MCHC RBC-ENTMCNC: 31.5 GM/DL — LOW (ref 32–36)
MCV RBC AUTO: 84.8 FL — SIGNIFICANT CHANGE UP (ref 80–100)
PHOSPHATE SERPL-MCNC: 3.8 MG/DL — SIGNIFICANT CHANGE UP (ref 2.5–4.5)
PLATELET # BLD AUTO: 322 K/UL — SIGNIFICANT CHANGE UP (ref 150–400)
POTASSIUM SERPL-MCNC: 4.5 MMOL/L — SIGNIFICANT CHANGE UP (ref 3.5–5.3)
POTASSIUM SERPL-SCNC: 4.5 MMOL/L — SIGNIFICANT CHANGE UP (ref 3.5–5.3)
PROT SERPL-MCNC: 7.1 G/DL — SIGNIFICANT CHANGE UP (ref 6–8.3)
RBC # BLD: 4.53 M/UL — SIGNIFICANT CHANGE UP (ref 3.8–5.2)
RBC # FLD: 13.6 % — SIGNIFICANT CHANGE UP (ref 10.3–14.5)
SODIUM SERPL-SCNC: 139 MMOL/L — SIGNIFICANT CHANGE UP (ref 135–145)
WBC # BLD: 15.68 K/UL — HIGH (ref 3.8–10.5)
WBC # FLD AUTO: 15.68 K/UL — HIGH (ref 3.8–10.5)

## 2019-07-18 PROCEDURE — 84100 ASSAY OF PHOSPHORUS: CPT

## 2019-07-18 PROCEDURE — 83690 ASSAY OF LIPASE: CPT

## 2019-07-18 PROCEDURE — 74183 MRI ABD W/O CNTR FLWD CNTR: CPT

## 2019-07-18 PROCEDURE — 85730 THROMBOPLASTIN TIME PARTIAL: CPT

## 2019-07-18 PROCEDURE — A9585: CPT

## 2019-07-18 PROCEDURE — 99285 EMERGENCY DEPT VISIT HI MDM: CPT

## 2019-07-18 PROCEDURE — 86901 BLOOD TYPING SEROLOGIC RH(D): CPT

## 2019-07-18 PROCEDURE — 86900 BLOOD TYPING SEROLOGIC ABO: CPT

## 2019-07-18 PROCEDURE — 84702 CHORIONIC GONADOTROPIN TEST: CPT

## 2019-07-18 PROCEDURE — 74328 X-RAY BILE DUCT ENDOSCOPY: CPT

## 2019-07-18 PROCEDURE — C1769: CPT

## 2019-07-18 PROCEDURE — 80053 COMPREHEN METABOLIC PANEL: CPT

## 2019-07-18 PROCEDURE — 86850 RBC ANTIBODY SCREEN: CPT

## 2019-07-18 PROCEDURE — 85610 PROTHROMBIN TIME: CPT

## 2019-07-18 PROCEDURE — 81001 URINALYSIS AUTO W/SCOPE: CPT

## 2019-07-18 PROCEDURE — 93005 ELECTROCARDIOGRAM TRACING: CPT

## 2019-07-18 PROCEDURE — 82803 BLOOD GASES ANY COMBINATION: CPT

## 2019-07-18 PROCEDURE — 85027 COMPLETE CBC AUTOMATED: CPT

## 2019-07-18 PROCEDURE — 80048 BASIC METABOLIC PNL TOTAL CA: CPT

## 2019-07-18 PROCEDURE — 76700 US EXAM ABDOM COMPLETE: CPT

## 2019-07-18 PROCEDURE — 83735 ASSAY OF MAGNESIUM: CPT

## 2019-07-18 PROCEDURE — 80076 HEPATIC FUNCTION PANEL: CPT

## 2019-07-18 PROCEDURE — C1889: CPT

## 2019-07-18 PROCEDURE — 88304 TISSUE EXAM BY PATHOLOGIST: CPT

## 2019-07-18 RX ORDER — ACETAMINOPHEN 500 MG
2 TABLET ORAL
Qty: 0 | Refills: 0 | DISCHARGE
Start: 2019-07-18

## 2019-07-18 RX ORDER — OXYCODONE HYDROCHLORIDE 5 MG/1
1 TABLET ORAL
Qty: 5 | Refills: 0
Start: 2019-07-18

## 2019-07-18 RX ORDER — ACETAMINOPHEN 500 MG
650 TABLET ORAL EVERY 6 HOURS
Refills: 0 | Status: DISCONTINUED | OUTPATIENT
Start: 2019-07-18 | End: 2019-07-18

## 2019-07-18 RX ORDER — OXYCODONE HYDROCHLORIDE 5 MG/1
5 TABLET ORAL EVERY 4 HOURS
Refills: 0 | Status: DISCONTINUED | OUTPATIENT
Start: 2019-07-18 | End: 2019-07-18

## 2019-07-18 RX ADMIN — HEPARIN SODIUM 5000 UNIT(S): 5000 INJECTION INTRAVENOUS; SUBCUTANEOUS at 00:03

## 2019-07-18 RX ADMIN — HEPARIN SODIUM 5000 UNIT(S): 5000 INJECTION INTRAVENOUS; SUBCUTANEOUS at 09:16

## 2019-07-18 NOTE — DISCHARGE NOTE PROVIDER - CONDITIONS AT DISCHARGE
Seen and examined by Attending Physician Dr. Antonio who deemed, and cleared patient stable for discharge.

## 2019-07-18 NOTE — DISCHARGE NOTE PROVIDER - NSDCCPCAREPLAN_GEN_ALL_CORE_FT
PRINCIPAL DISCHARGE DIAGNOSIS  Diagnosis: Gallbladder disease  Assessment and Plan of Treatment: Biliary Colic s/p Laparoscpic Cholecystectomy

## 2019-07-18 NOTE — DISCHARGE NOTE NURSING/CASE MANAGEMENT/SOCIAL WORK - NSDCDPATPORTLINK_GEN_ALL_CORE
You can access the [facility], offered by [facility], by registering with the following website: [email or web address]

## 2019-07-18 NOTE — PROGRESS NOTE ADULT - SUBJECTIVE AND OBJECTIVE BOX
Trauma Surgery Progress Note    SUBJECTIVE/ROS: Patient examined at bed side. No acute o/n events. Denies nausea, vomiting, chest pain, shortness of breath. Had rhoda jama yesterday, doing well. Post-op check unremarkable. Pt. walking floors and ready for d/c.    Physical Exam:  Gen: NAD, resting comfortably in bed  Resp: No increased WOB   Abd: appropriately tender, nondistended, soft, compressible        Objective:  Vital Signs Last 24 Hrs  T(C): 36.7 (18 Jul 2019 09:12), Max: 37.3 (18 Jul 2019 00:56)  T(F): 98 (18 Jul 2019 09:12), Max: 99.2 (18 Jul 2019 00:56)  HR: 80 (18 Jul 2019 09:12) (71 - 86)  BP: 122/79 (18 Jul 2019 09:12) (120/75 - 147/81)  BP(mean): 108 (17 Jul 2019 22:35) (96 - 108)  RR: 16 (18 Jul 2019 09:12) (16 - 18)  SpO2: 98% (18 Jul 2019 09:12) (96% - 100%)    I&O's Detail    17 Jul 2019 07:01  -  18 Jul 2019 07:00  --------------------------------------------------------  IN:    dextrose 5% + sodium chloride 0.45% with potassium chloride 20 mEq/L: 1375 mL    sodium chloride 0.9%: 1625 mL  Total IN: 3000 mL    OUT:    Voided: 400 mL  Total OUT: 400 mL    Total NET: 2600 mL      18 Jul 2019 07:01  -  18 Jul 2019 12:42  --------------------------------------------------------  IN:    Oral Fluid: 240 mL  Total IN: 240 mL    OUT:  Total OUT: 0 mL    Total NET: 240 mL          MEDICATIONS  (STANDING):  heparin  Injectable 5000 Unit(s) SubCutaneous every 8 hours    MEDICATIONS  (PRN):  acetaminophen   Tablet .. 650 milliGRAM(s) Oral every 6 hours PRN Mild Pain (1 - 3)  oxyCODONE    IR 5 milliGRAM(s) Oral every 4 hours PRN Severe Pain (7 - 10)                            12.1   15.68 )-----------( 322      ( 18 Jul 2019 08:32 )             38.4       07-18    139  |  101  |  8   ----------------------------<  108<H>  4.5   |  24  |  0.75    Ca    8.8      18 Jul 2019 06:33  Phos  3.8     07-18  Mg     2.0     07-18    TPro  7.1  /  Alb  4.1  /  TBili  0.7  /  DBili  x   /  AST  118<H>  /  ALT  432<H>  /  AlkPhos  181<H>  07-18

## 2019-07-18 NOTE — CHART NOTE - NSCHARTNOTEFT_GEN_A_CORE
STATUS POST:  Lap evita     POST OPERATIVE DAY #: 0    SUBJECTIVE: Pt seen and examined.   SOB:  [ ] YES [ X ] NO  Chest Discomfort: [ ] YES [ X ] NO    Nausea: [ ] YES [ X ] NO           Vomiting: [ ] YES [ X ] NO  Flatus: [ ] YES [ X ] NO             Bowel Movement: [ ] YES [X ] NO  Diarrhea: [ ] YES [ X ] NO         Void: [ X ]YES [ ]No  Constipation: [ ] YES [ X ] NO             Pain Control Adequate: [ X ] YES [ ] NO  Riddle: NA  NGT: NA    Vital Signs Last 24 Hrs  T(C): 36.8 (18 Jul 2019 04:16), Max: 37.3 (18 Jul 2019 00:56)  T(F): 98.3 (18 Jul 2019 04:16), Max: 99.2 (18 Jul 2019 00:56)  HR: 86 (18 Jul 2019 04:16) (71 - 86)  BP: 127/80 (18 Jul 2019 04:16) (115/80 - 147/81)  BP(mean): 108 (17 Jul 2019 22:35) (96 - 108)  RR: 17 (18 Jul 2019 04:16) (16 - 18)  SpO2: 100% (18 Jul 2019 04:16) (96% - 100%)  I&O's Summary    17 Jul 2019 07:01  -  18 Jul 2019 07:00  --------------------------------------------------------  IN: 3000 mL / OUT: 400 mL / NET: 2600 mL      I&O's Detail    17 Jul 2019 07:01  -  18 Jul 2019 07:00  --------------------------------------------------------  IN:    dextrose 5% + sodium chloride 0.45% with potassium chloride 20 mEq/L: 1375 mL    sodium chloride 0.9%.: 1625 mL  Total IN: 3000 mL    OUT:    Voided: 400 mL  Total OUT: 400 mL    Total NET: 2600 mL          MEDICATIONS  (STANDING):  heparin  Injectable 5000 Unit(s) SubCutaneous every 8 hours  sodium chloride 0.9%. 1000 milliLiter(s) (125 mL/Hr) IV Continuous <Continuous>    MEDICATIONS  (PRN):      LABS:                        13.1   7.52  )-----------( 335      ( 17 Jul 2019 09:04 )             41.5     07-18    139  |  101  |  8   ----------------------------<  108<H>  4.5   |  24  |  0.75    Ca    8.8      18 Jul 2019 06:33  Phos  3.8     07-18  Mg     2.0     07-18    TPro  7.1  /  Alb  4.1  /  TBili  0.7  /  DBili  x   /  AST  118<H>  /  ALT  432<H>  /  AlkPhos  181<H>  07-18    PT/INR - ( 16 Jul 2019 10:06 )   PT: 11.9 sec;   INR: 1.05 ratio         PTT - ( 16 Jul 2019 10:06 )  PTT:32.1 sec      RADIOLOGY & ADDITIONAL STUDIES:    PHYSICAL EXAM:    Constitutional: NAD, resting comfortable in bed.     Respiratory: no increased WOB     Gastrointestinal: soft appropriately tender in the RUQ, mild blood on umbilical dressing, soft, compressible     Extremities: full voluntary movement in all ext     A/P: 34y Female Disorder of gallbladder  Handoff  MEWS Score  Gall stones  Hypertension  Gestational diabetes  Choledocholithiasis  Choledocholithiasis  Gallbladder disease  Laparoscopic cholecystectomy  No significant past surgical history  RUQ PAIN  9   s/p lap veita   - Diet: regular   - Activity: OOB  - Labs: none   - Pain medication: oxycodone and tylenol  - DVT ppx

## 2019-07-18 NOTE — DISCHARGE NOTE PROVIDER - NSDCCPTREATMENT_GEN_ALL_CORE_FT
PRINCIPAL PROCEDURE  Procedure: Laparoscopic cholecystectomy  Findings and Treatment: Non-complicated procedure.

## 2019-07-18 NOTE — DISCHARGE NOTE PROVIDER - HOSPITAL COURSE
Patient is a 34y.o female with known gallstones presents with one day of severe RUQ pain associated with 2 episodes of vomiting. Patient was last seen in the ED over a week prior to this admission,  and was diagnosed with biliary colic. She presented to the hospital on this admission because she developed severe RUQ abdominal pain that radiated to the flank and back. She went to Urgent Care first, was given Toradol, and was then advised to go to the ED if the pain worsened. No prior abdominal surgeries, she is s/p a vaginal delivery approximately 1 year ago. Patient was started on labetalol after her pregnancy due to pre-eclampsia; has been slowly titrating down on the dose.        On admission patient had an ultrasound performed: Patient is a 34y.o female with known gallstones presents with one day of severe RUQ pain associated with 2 episodes of vomiting. Patient was last seen in the ED over a week prior to this admission,  and was diagnosed with biliary colic. She presented to the hospital on this admission because she developed severe RUQ abdominal pain that radiated to the flank and back. She went to Urgent Care first, was given Toradol, and was then advised to go to the ED if the pain worsened. No prior abdominal surgeries, she is s/p a vaginal delivery approximately 1 year ago. Patient was started on labetalol after her pregnancy due to pre-eclampsia; has been slowly titrating down on the dose.        On admission patient had an ultrasound performed: < from: US Abdomen Complete (07.14.19 @ 22:41) >IMPRESSION: Hepatic steatosis. Cholelithiasis. No sonographic evidence of acute cholecystitis. < end of copied text >.  Patient was kept NPO and Lab work was trended.     On 7/16/2019: MRCP was performed which showed: < from: MR MRCP w/wo IV Cont (07.15.19 @ 16:57) >IMPRESSION: Distal choledocholithiasis without biliary ductal dilatation. Cholelithiasis. < end of copied text >.         Patient was taken to the operating room on 7/17/2019 for a non-complicated Cholecystectomy. Patient is a 34y.o female with known gallstones presents with one day of severe RUQ pain associated with 2 episodes of vomiting. Patient was last seen in the ED over a week prior to this admission,  and was diagnosed with biliary colic. She presented to the hospital on this admission because she developed severe RUQ abdominal pain that radiated to the flank and back. She went to Urgent Care first, was given Toradol, and was then advised to go to the ED if the pain worsened. No prior abdominal surgeries, she is s/p a vaginal delivery approximately 1 year ago. Patient was started on labetalol after her pregnancy due to pre-eclampsia; has been slowly titrating down on the dose.        On admission patient had an ultrasound performed: < from: US Abdomen Complete (07.14.19 @ 22:41) >IMPRESSION: Hepatic steatosis. Cholelithiasis. No sonographic evidence of acute cholecystitis. < end of copied text >.  Patient was kept NPO and Lab work was trended. On 7/16/2019: MRCP was performed which showed: < from: MR MRCP w/wo IV Cont (07.15.19 @ 16:57) >IMPRESSION: Distal choledocholithiasis without biliary ductal dilatation. Cholelithiasis. < end of copied text >.         Patient was taken to the operating room on 7/17/2019 for a non-complicated Cholecystectomy. Post operatively pain was controlled on PO regimen, and patient was advanced as tolerated.

## 2019-07-18 NOTE — PROGRESS NOTE ADULT - REASON FOR ADMISSION
abdominal pain with vomiting

## 2019-07-18 NOTE — PROGRESS NOTE ADULT - ASSESSMENT
33 yo F with choledocholithias   - patients MRCP is positive   - ERCP completed 7/17  - s/p lap evita 7/17  - pain control   - OOB   - monitor BP   - POC   -d/c home today

## 2019-07-18 NOTE — DISCHARGE NOTE PROVIDER - NSDCFUADDINST_GEN_ALL_CORE_FT
WOUND CARE: Place clean dry gauze on your incision, and change daily. Your bandage may have small noted areas of blood on your dressing this is normal. If your dressing continues to ooze and saturate please notify your surgeon.   BATHING: Please do not submerge wound underwater. You may shower and/or sponge bathe.  ACTIVITY: Do not lift anything heavier than 10 lbs, which is about a gallon of milk. No Strenuous activity for about 6 weeks, and until cleared by your surgeon. Strenuous activity includes yoga, weight lifting, and cycling. Stairs and walking are ok. Otherwise, you may return to your usual level of physical activity. If you are taking narcotic pain medication (such as Percocet), do NOT drive a car, operate machinery or make important decisions.  DIET: Return to your usual diet.  NOTIFY YOUR SURGEON IF: You have any bleeding that does not stop, any pus draining from your wound, any fever (over 100.4 F) or chills, persistent nausea/vomiting, persistent diarrhea, or if your pain is not controlled on your discharge pain medications.  FOLLOW-UP:  1. Please call to make a follow-up appointment within one week of discharge with Dr. Antonio.   2. Please follow up with your primary care physician in one week regarding your hospitalization. WOUND CARE: Place clean dry gauze on your incision, and change daily. Your bandage may have small noted areas of blood on your dressing this is normal. If your dressing continues to ooze and saturate please notify your surgeon. The steri-stripes will fall off on their own.   BATHING: Please do not submerge wound underwater. You may shower and/or sponge bathe.  ACTIVITY: Do not lift anything heavier than 10 lbs, which is about a gallon of milk. No Strenuous activity for about 6 weeks, and until cleared by your surgeon. Strenuous activity includes yoga, weight lifting, and cycling. Stairs and walking are ok. Otherwise, you may return to your usual level of physical activity. If you are taking narcotic pain medication (such as Percocet), do NOT drive a car, operate machinery or make important decisions.  DIET: Return to your usual diet.  NOTIFY YOUR SURGEON IF: You have any bleeding that does not stop, any pus draining from your wound, any fever (over 100.4 F) or chills, persistent nausea/vomiting, persistent diarrhea, or if your pain is not controlled on your discharge pain medications.  FOLLOW-UP:  1. Please call to make a follow-up appointment within one week of discharge with Dr. Antonio.   2. Please follow up with your primary care physician in one week regarding your hospitalization.

## 2019-07-23 PROBLEM — K80.20 CALCULUS OF GALLBLADDER WITHOUT CHOLECYSTITIS WITHOUT OBSTRUCTION: Chronic | Status: ACTIVE | Noted: 2019-07-14

## 2019-07-25 ENCOUNTER — APPOINTMENT (OUTPATIENT)
Dept: TRAUMA SURGERY | Facility: CLINIC | Age: 34
End: 2019-07-25
Payer: COMMERCIAL

## 2019-07-25 VITALS
DIASTOLIC BLOOD PRESSURE: 86 MMHG | TEMPERATURE: 98.4 F | HEART RATE: 81 BPM | SYSTOLIC BLOOD PRESSURE: 128 MMHG | BODY MASS INDEX: 34.86 KG/M2 | WEIGHT: 230 LBS | HEIGHT: 68 IN

## 2019-07-25 DIAGNOSIS — O24.919 UNSPECIFIED DIABETES MELLITUS IN PREGNANCY, UNSPECIFIED TRIMESTER: ICD-10-CM

## 2019-07-25 DIAGNOSIS — K81.0 ACUTE CHOLECYSTITIS: ICD-10-CM

## 2019-07-25 DIAGNOSIS — Z86.79 PERSONAL HISTORY OF OTHER DISEASES OF THE CIRCULATORY SYSTEM: ICD-10-CM

## 2019-07-25 PROCEDURE — 99024 POSTOP FOLLOW-UP VISIT: CPT

## 2019-07-25 NOTE — HISTORY OF PRESENT ILLNESS
[de-identified] :  returns for follow up after lap evita for acute cholecystitis. She is feeling well. No nausea, vomiting, diarrhea. Eating normally with good appetite. No fevers or chills.\par \par Abd soft, nontender, nondistended.\par Lap evita wounds closed, without erythema, drainage, or fluctuance.\par \par Path still pending, will follow up.\par Return to office prn.

## 2019-07-26 LAB — SURGICAL PATHOLOGY STUDY: SIGNIFICANT CHANGE UP

## 2019-12-17 ENCOUNTER — APPOINTMENT (OUTPATIENT)
Dept: TRAUMA SURGERY | Facility: CLINIC | Age: 34
End: 2019-12-17
Payer: COMMERCIAL

## 2019-12-17 VITALS
HEART RATE: 83 BPM | WEIGHT: 238 LBS | HEIGHT: 68 IN | DIASTOLIC BLOOD PRESSURE: 86 MMHG | BODY MASS INDEX: 36.07 KG/M2 | RESPIRATION RATE: 18 BRPM | SYSTOLIC BLOOD PRESSURE: 132 MMHG

## 2019-12-17 DIAGNOSIS — G89.18 OTHER ACUTE POSTPROCEDURAL PAIN: ICD-10-CM

## 2019-12-17 PROCEDURE — 99212 OFFICE O/P EST SF 10 MIN: CPT

## 2019-12-17 RX ORDER — URINE ACETONE TEST STRIPS
STRIP MISCELLANEOUS
Qty: 1 | Refills: 1 | Status: COMPLETED | COMMUNITY
Start: 2018-03-27 | End: 2019-12-17

## 2019-12-17 RX ORDER — BLOOD SUGAR DIAGNOSTIC
STRIP MISCELLANEOUS
Qty: 1 | Refills: 2 | Status: DISCONTINUED | COMMUNITY
Start: 2018-03-27 | End: 2019-12-17

## 2019-12-17 RX ORDER — INSULIN HUMAN 100 [IU]/ML
100 INJECTION, SUSPENSION SUBCUTANEOUS
Qty: 1 | Refills: 1 | Status: COMPLETED | COMMUNITY
Start: 2018-04-16 | End: 2019-12-17

## 2019-12-17 RX ORDER — ISOPROPYL ALCOHOL 0.7 ML/ML
SWAB TOPICAL
Qty: 1 | Refills: 2 | Status: COMPLETED | COMMUNITY
Start: 2018-04-16 | End: 2019-12-17

## 2019-12-17 RX ORDER — LANCETS 30 GAUGE
EACH MISCELLANEOUS
Qty: 1 | Refills: 4 | Status: DISCONTINUED | COMMUNITY
Start: 2018-03-27 | End: 2019-12-17

## 2019-12-17 RX ORDER — PEN NEEDLE, DIABETIC 29 G X1/2"
32G X 4 MM NEEDLE, DISPOSABLE MISCELLANEOUS
Qty: 1 | Refills: 1 | Status: COMPLETED | COMMUNITY
Start: 2018-04-16 | End: 2019-12-17

## 2019-12-17 NOTE — HISTORY OF PRESENT ILLNESS
[de-identified] : Ms. George had a lap evita in July 2019 for acute evita, and initially did well. However, she notes that over the past month she has had RUQ discomfort (not pain, but soreness and a sensation of "something there") that radiates around to her back, without N/V, no diarrhea or constipation, normal po intake. Better with leaning forward. No fevers or chills. 10-pt ROS otherwise negative.\par \par PE:\par Very pleasant, no distress\par A&Ox4, moves all extremities, normal gait\par No scleral icterus\par No rashes\par Abd soft, mildly TTP RUQ without rebound or guarding, well-healed lap evita incisions without erythema or induration\par Normal affect\par \par Although it would be unusual to have a retained CBD stone presenting this late, it is certainly possible, so I have ordered lab work, including LFTs and lipase, as well as a RUQ ultrasound to rule out fluid collections/ abscess. Given pt's young age and having already had a CT recently, will try to avoid CT unless absolutely necessary, to which pt agrees. I have asked her to call after she has these tests done so I can follow up with her on the phone.

## 2019-12-20 ENCOUNTER — OUTPATIENT (OUTPATIENT)
Dept: OUTPATIENT SERVICES | Facility: HOSPITAL | Age: 34
LOS: 1 days | End: 2019-12-20

## 2019-12-20 ENCOUNTER — APPOINTMENT (OUTPATIENT)
Dept: ULTRASOUND IMAGING | Facility: CLINIC | Age: 34
End: 2019-12-20
Payer: COMMERCIAL

## 2019-12-20 DIAGNOSIS — G89.18 OTHER ACUTE POSTPROCEDURAL PAIN: ICD-10-CM

## 2019-12-20 PROCEDURE — 76705 ECHO EXAM OF ABDOMEN: CPT | Mod: 26

## 2019-12-23 ENCOUNTER — TRANSCRIPTION ENCOUNTER (OUTPATIENT)
Age: 34
End: 2019-12-23

## 2019-12-24 ENCOUNTER — LABORATORY RESULT (OUTPATIENT)
Age: 34
End: 2019-12-24

## 2020-01-07 ENCOUNTER — RESULT REVIEW (OUTPATIENT)
Age: 35
End: 2020-01-07

## 2020-10-13 ENCOUNTER — APPOINTMENT (OUTPATIENT)
Dept: SPINE | Facility: CLINIC | Age: 35
End: 2020-10-13
Payer: COMMERCIAL

## 2020-10-13 VITALS
HEART RATE: 86 BPM | BODY MASS INDEX: 34.56 KG/M2 | HEIGHT: 68 IN | DIASTOLIC BLOOD PRESSURE: 84 MMHG | SYSTOLIC BLOOD PRESSURE: 124 MMHG | WEIGHT: 228 LBS | OXYGEN SATURATION: 98 % | RESPIRATION RATE: 16 BRPM | TEMPERATURE: 97.3 F

## 2020-10-13 PROCEDURE — 99203 OFFICE O/P NEW LOW 30 MIN: CPT

## 2020-10-13 NOTE — PHYSICAL EXAM
[Person] : oriented to person [Place] : oriented to place [Time] : oriented to time [Short Term Intact] : short term memory intact [Remote Intact] : remote memory intact [Span Intact] : the attention span was normal [Concentration Intact] : normal concentrating ability [Fluency] : fluency intact [Comprehension] : comprehension intact [Current Events] : adequate knowledge of current events [Past History] : adequate knowledge of personal past history [Vocabulary] : adequate range of vocabulary [Cranial Nerves Optic (II)] : visual acuity intact bilaterally,  pupils equal round and reactive to light [Cranial Nerves Oculomotor (III)] : extraocular motion intact [Cranial Nerves Trigeminal (V)] : facial sensation intact symmetrically [Cranial Nerves Facial (VII)] : face symmetrical [Cranial Nerves Vestibulocochlear (VIII)] : hearing was intact bilaterally [Cranial Nerves Glossopharyngeal (IX)] : tongue and palate midline [Cranial Nerves Accessory (XI - Cranial And Spinal)] : head turning and shoulder shrug symmetric [Cranial Nerves Hypoglossal (XII)] : there was no tongue deviation with protrusion [Motor Tone] : muscle tone was normal in all four extremities [Motor Strength] : muscle strength was normal in all four extremities [No Muscle Atrophy] : normal bulk in all four extremities [Sensation Tactile Decrease] : light touch was intact [Abnormal Walk] : normal gait [Balance] : balance was intact [Past-pointing] : there was no past-pointing [Tremor] : no tremor present [2+] : Patella left 2+ [FreeTextEntry5] : No evidence of temporal visual deficits. [FreeTextEntry1] : The patient has stretch marks on her torso which she states have been there since she was young.

## 2020-10-13 NOTE — DISCHARGE NOTE OB - DRINK 8 TO 10 GLASSES OF FLUID EACH DAY
OCCUPATIONAL THERAPY EVALUATION - INPATIENT     Room Number: 4511/7829-T  Evaluation Date: 10/13/2020  Type of Evaluation: Initial  Presenting Problem: lumbar drain trial 10/13    Physician Order: IP Consult to Occupational Therapy  Reason for Therapy: ADL toilet  Shower/Tub and Equipment: Walk-in shower       Occupation/Status:   Hand Dominance: Right  Drives: Yes       Prior Level of Function: lives alone with 2 dogs. Independent with all ADL and IADL. Walks her 35-40 lbs dogs daily. O2 SATURATIONS                ACTIVITIES OF DAILY LIVING ASSESSMENT  AM-PAC ‘6-Clicks’ Inpatient Daily Activity Short Form  How much help from another person does the patient currently need…  -   Putting on and taking off regular lower body clothing?: Profile/Medical History LOW - Brief history including review of medical or therapy records    Specific performance deficits impacting engagement in ADL/IADL LOW  1 - 3 performance deficits    Client Assessment/Performance Deficits MODERATE - Comorbidities Statement Selected

## 2020-10-13 NOTE — CONSULT LETTER
[Dear  ___] : Dear  [unfilled], [Consult Letter:] : I had the pleasure of evaluating your patient, [unfilled]. [Please see my note below.] : Please see my note below. [Consult Closing:] : Thank you very much for allowing me to participate in the care of this patient.  If you have any questions, please do not hesitate to contact me. [Sincerely,] : Sincerely, [FreeTextEntry2] : Bita Mccarthy M.D.\par [facility] [address]par [place] 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

## 2020-10-13 NOTE — REASON FOR VISIT
[New Patient Visit] : a new patient visit [Referred By: _________] : Patient was referred by ESTEBAN [FreeTextEntry1] : The patient is being seen regarding a pituitary adenoma.

## 2020-10-13 NOTE — RESULTS/DATA
[FreeTextEntry1] : There is a sellar mass is inseparable from the pituitary gland extending superiorly into the superior sagittal sinus with mild optic chiasm compression, laterally into the cavernous sinuses and inferiorly into the sphenoid sinus suspicious for a pituitary macroadenoma.

## 2020-10-13 NOTE — DATA REVIEWED
[de-identified] : Brain with and without contrast done at [facility] on 5/20/2018.

## 2020-10-13 NOTE — REVIEW OF SYSTEMS
[Palpitations] : palpitations [As Noted in HPI] : as noted in HPI [Negative] : Heme/Lymph [FreeTextEntry2] : fatigue [FreeTextEntry3] : blurred vision [FreeTextEntry7] : GERD

## 2020-10-13 NOTE — ASSESSMENT
[FreeTextEntry1] : The images and findings were reviewed and discussed with the patient. It was recommended that the patient have a prolactin level which was ordered. It was also recommended that she had a formal endocrine evaluation and was referred to Dr. Rishi Haley. She is also to have a formal neuroophthalmic exam and was referred to Dr. Kenyon Donaldson. She is to return with a new MRI of the brain and fell with and without contrast for Dr. Hipolito Keating to review. The patient understood and agreed with this plan.

## 2020-10-13 NOTE — HISTORY OF PRESENT ILLNESS
[Other: ___] : [unfilled] [FreeTextEntry1] : The patient was diagnosed with a pituitary tumor in May of 2018 and is her to follow up. [de-identified] : JAMAAL MARS is a 35 year old lady who reports that when she gave birth to her daughter in May of 2018,she had preeclampsia and gestational diabetes.. A CAT scan of the brain was done which revealed a pituitary adenoma.The patient had a dedicated MRI of the brain and sella done on May 20, 2018 which revealed a sellar mass inseparable from the pituitary gland extending superiorly into the superior sagittal sinus with mild optic chiasm compression, laterally into the cavernous sinuses and inferiorly into the sphenoid sinus suspicious for a pituitary macroadenoma.  It was thought that the abnormal imaging of her pituitary may have been related to her having been pregnant.  The patient stated that she did not have a follow up specifically to her pituitary adenoma. She has not seen an endocrinologist. Eight months after she gave birth she had abdominal pain and underwent a cholecystectomy in July of 2019 which further delayed her followup.  She was seen at [facility]  in December and was told that her optic nerves were enlarged. The patient was referred to see an ophthalmologist at [facility], however the COVID pandemic started soon after and she was not able to.  She does complain of blurred vision.  The patient stated that she has gained weight and has grown facial hair and then her hair is thinning. She states that her periods are regular and denies any galactorrhea.

## 2020-10-26 ENCOUNTER — TRANSCRIPTION ENCOUNTER (OUTPATIENT)
Age: 35
End: 2020-10-26

## 2020-11-09 ENCOUNTER — TRANSCRIPTION ENCOUNTER (OUTPATIENT)
Age: 35
End: 2020-11-09

## 2020-11-12 ENCOUNTER — OUTPATIENT (OUTPATIENT)
Dept: OUTPATIENT SERVICES | Facility: HOSPITAL | Age: 35
LOS: 1 days | End: 2020-11-12
Payer: COMMERCIAL

## 2020-11-12 ENCOUNTER — APPOINTMENT (OUTPATIENT)
Dept: MRI IMAGING | Facility: CLINIC | Age: 35
End: 2020-11-12
Payer: COMMERCIAL

## 2020-11-12 DIAGNOSIS — Z00.8 ENCOUNTER FOR OTHER GENERAL EXAMINATION: ICD-10-CM

## 2020-11-12 DIAGNOSIS — D35.2 BENIGN NEOPLASM OF PITUITARY GLAND: ICD-10-CM

## 2020-11-12 PROCEDURE — 70553 MRI BRAIN STEM W/O & W/DYE: CPT

## 2020-11-12 PROCEDURE — 70553 MRI BRAIN STEM W/O & W/DYE: CPT | Mod: 26

## 2020-11-12 PROCEDURE — A9585: CPT

## 2020-11-16 ENCOUNTER — APPOINTMENT (OUTPATIENT)
Dept: SPINE | Facility: CLINIC | Age: 35
End: 2020-11-16
Payer: COMMERCIAL

## 2020-11-16 VITALS
WEIGHT: 220 LBS | DIASTOLIC BLOOD PRESSURE: 85 MMHG | BODY MASS INDEX: 34.53 KG/M2 | OXYGEN SATURATION: 96 % | HEART RATE: 96 BPM | HEIGHT: 67 IN | TEMPERATURE: 97.6 F | SYSTOLIC BLOOD PRESSURE: 140 MMHG

## 2020-11-16 LAB — PROLACTIN SERPL-MCNC: 25.1 NG/ML

## 2020-11-16 PROCEDURE — 99213 OFFICE O/P EST LOW 20 MIN: CPT

## 2020-11-16 PROCEDURE — 99072 ADDL SUPL MATRL&STAF TM PHE: CPT

## 2020-11-16 NOTE — ASSESSMENT
[FreeTextEntry1] : The images and findings were reviewed and discussed with the patient.  She is to be evaluated by  and  a formal visual exam will be done.  Her lab work will be reviewed when it is received. It was recommended that she return to the office in 6 months with a new MRI of the brain with and without contrast for review.

## 2020-11-16 NOTE — DATA REVIEWED
[de-identified] : Brain with and without contrast done on 11/12/2020 at [facility] compared to 5/20/2018.

## 2021-01-14 NOTE — PROGRESS NOTE ADULT - SUBJECTIVE AND OBJECTIVE BOX
Spoke with patient advised of gastric emptying study per Dr Kisha George previous note  He voiced understanding and did not have any questions  TAYE MARSTANY  MRN-[id number]    Subjective:PT DOING WELL. NO C/O.   ON LABETALOL 200 MG TID. BPS WELL CONTROLLED.    Vital Signs Last 24 Hrs  T(C): 36.7 (12 May 2018 06:08), Max: 36.9 (11 May 2018 13:27)  T(F): 98 (12 May 2018 06:08), Max: 98.4 (11 May 2018 13:27)  HR: 87 (12 May 2018 06:08) (80 - 96)  BP: 121/89 (12 May 2018 06:08) (106/71 - 124/80)  BP(mean): --  RR: 18 (12 May 2018 06:08) (18 - 18)  SpO2: --    PHYSICAL EXAM:      Constitutional:OBESE FEM NAD      Breasts: normal  Abdomen: Firm fundus  Pelvic: Lochia mild                                        REVIEW OF SYSTEMS      General:	    Skin/Breast:  		  Gastrointestinal:	    Genitourinary:	      MEDICATIONS  (STANDING):  acetaminophen   Tablet. 975 milliGRAM(s) Oral every 6 hours  diphtheria/tetanus/pertussis (acellular) Vaccine (ADAcel) 0.5 milliLiter(s) IntraMuscular once  ibuprofen  Tablet 600 milliGRAM(s) Oral every 6 hours  ketorolac   Injectable 30 milliGRAM(s) IV Push once  labetalol 200 milliGRAM(s) Oral every 8 hours  oxyCODONE    IR 5 milliGRAM(s) Oral every 3 hours  oxytocin Infusion 4 milliUNIT(s)/Min (4 mL/Hr) IV Continuous <Continuous>  oxytocin Infusion 41.667 milliUNIT(s)/Min (125 mL/Hr) IV Continuous <Continuous>  prenatal multivitamin 1 Tablet(s) Oral daily  sodium chloride 0.9% lock flush 3 milliLiter(s) IV Push every 8 hours    MEDICATIONS  (PRN):  dibucaine 1% Ointment 1 Application(s) Topical every 4 hours PRN Perineal Discomfort  diphenhydrAMINE   Capsule 25 milliGRAM(s) Oral every 6 hours PRN Itching  docusate sodium 100 milliGRAM(s) Oral two times a day PRN Stool Softening  glycerin Suppository - Adult 1 Suppository(s) Rectal at bedtime PRN Constipation  hydrocortisone 1% Cream 1 Application(s) Topical every 4 hours PRN Moderate to Severe Perineal Pain  lanolin Ointment 1 Application(s) Topical every 6 hours PRN Sore Nipples  magnesium hydroxide Suspension 30 milliLiter(s) Oral two times a day PRN Constipation  oxyCODONE    IR 5 milliGRAM(s) Oral every 4 hours PRN Severe Pain (7 -10)  pramoxine 1%/zinc 5% Cream 1 Application(s) Topical every 4 hours PRN Moderate to Severe Perineal Pain  simethicone 80 milliGRAM(s) Chew every 6 hours PRN Gas  witch hazel Pads 1 Application(s) Topical every 4 hours PRN Perineal Discomfort    Allergies    No Known Allergies    Intolerances        Impression:PPD #2 VAVD, GHTN, A2 DM    Plan:D/C HOME  PT TO TAKE BPS TID JUST BEFORE LABETALOL. HOLD DOSE IF </= 110/80. F/U OFFICE 2 WEEKS.  2 HR GCT 8 WEEKS    ANAIS العلي

## 2021-01-21 ENCOUNTER — APPOINTMENT (OUTPATIENT)
Dept: ENDOCRINOLOGY | Facility: CLINIC | Age: 36
End: 2021-01-21

## 2021-02-11 ENCOUNTER — APPOINTMENT (OUTPATIENT)
Dept: GASTROENTEROLOGY | Facility: CLINIC | Age: 36
End: 2021-02-11

## 2021-02-12 NOTE — ED STATDOCS - NS_EDPROVIDERDISPOUSERTYPE_ED_A_ED
I have personally evaluated and examined the patient. The Attending was available to me as a supervising provider if needed. done

## 2021-04-15 NOTE — ED ADULT NURSE REASSESSMENT NOTE - NS ED NURSE REASSESS COMMENT FT1
Pt returned from ultrasound. No acute distress noted. VSS. Awaiting disposition. Family @ bedside. Safety maintained.
PROVIDER:[TOKEN:[[id number]],FOLLOWUP:[Routine]]

## 2021-04-20 NOTE — ED ADULT TRIAGE NOTE - TEMPERATURE IN FAHRENHEIT (DEGREES F)
General: Patient is well appearing. Patient is alert and oriented to person, place and date. Patient is sitting stretcher and appears in no acute distress.  HEENT: Head is normocephalic and atraumatic. Pupils are equal, round and reactive. Extraocular movements intact. No evidence of nystagmus, conjunctival injection, or scleral icterus. External ears symmetric without evidence of discharge.  Nose is symmetric, non-tender, patent without evidence of discharge. Teeth in good repair. Uvula midline.   Neck: Supple with no evidence of lymphadenopathy.  Full range of motion.  Heart: Regular rate and rhythm. No murmurs, rubs or gallops.   Lungs: Clear to auscultation bilaterally with equal chest expansion. No note of wheezes, rhonchi, rales. Equal chest expansion. No note of retractions.  Abdomen: Bowel sounds present in all four quadrants. Soft, non-tender, non-distended without signs of masses, rebound or guarding. No note of hepatosplenomegaly. No CVA tenderness bilaterally. Negative Ovalle sign or McBurney's.  Neuro: Cranial nerves II-XII intact. GCS 15. Moving all extremities. Strength is 5/5 arms and legs bilaterally. Sensation intact in extremities. gait steady   Skin: Warm, dry and intact without evidence of rashes, bruising, pallor, jaundice or cyanosis.   Psych: Mood and affect appropriate. 98.1

## 2021-05-03 ENCOUNTER — RESULT REVIEW (OUTPATIENT)
Age: 36
End: 2021-05-03

## 2021-05-17 ENCOUNTER — APPOINTMENT (OUTPATIENT)
Dept: SPINE | Facility: CLINIC | Age: 36
End: 2021-05-17

## 2022-02-03 ENCOUNTER — APPOINTMENT (OUTPATIENT)
Dept: ENDOCRINOLOGY | Facility: CLINIC | Age: 37
End: 2022-02-03
Payer: COMMERCIAL

## 2022-02-03 DIAGNOSIS — E28.2 POLYCYSTIC OVARIAN SYNDROME: ICD-10-CM

## 2022-02-03 PROCEDURE — 99204 OFFICE O/P NEW MOD 45 MIN: CPT | Mod: 95

## 2022-02-03 NOTE — HISTORY OF PRESENT ILLNESS
[Home] : at home, [unfilled] , at the time of the visit. [Medical Office: ([facility])___] : at the medical office located in  [Verbal consent obtained from patient] : the patient, [unfilled] [FreeTextEntry1] : **** Initial visit has been rescheduled to a TEB b/o the COVID infection\par \par 36 year female  referred for pituitary evaluation \par \par Mrs George has been diagnosed with a pituitary adenoma in 2018, shortly after giving a birth to hr first child. She had a preeclampsia at that time, was admitted and had a CT scan of the head done, showing a pituitary adenoma.  Dedicated pituitary MRI from 5/19/18 showed  a mass in the sella inferior to the pituitary, which appears to be continuos with the pit gland, with the extension into the suprasellar cistern with a mild optic chiasm compression. Mass measured 1.8x3.6x2.2 cm. She did not follow up in that regards until was seen by Dr. Keating and Dr. Mcgovern (neuro-ophthalmologist at [facility]) at the end of 2020. At that time, her adenoma was deemed to be non-secreting, and she was advised to continue with observation. \par MRI pituitary (11/12/20)-  no interval changes in the 2.2x1.3x3.8 cm pit adenoma\par She was seen by Dr. Santacruz, who found her IGF-1 levels to be slightly elevated on one occasion. Repeated blood work about 4 months later was apparently normal. She was trying to have another child, but miscarried in August 2021\par At present, she denies: \par - excessive fatigue, weight changes (but has difficulties losing weight), headaches, peripheral vision changes \par - cold/heat intolerance \par - new purple stretch marks or easy bruising \par - jaw protruding, change in facial features or change in shoe/hat/ring size \par - dizziness/nausea/vomiting/abdominal pain \par - galactorrhea \par Also, she denies diaphoresis or sweating, chest pain or palpitations, SOB, diarrhea or constipation, anxiety or tremor, muscle aches\par She also reports a history of PCOS.\par

## 2022-02-03 NOTE — ASSESSMENT
[FreeTextEntry1] : Pituitary macroadenoma. 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 Mcgovern for a formal VFT\par - follow up with Dr. Keating\par RTC post labs/imaging\par \par

## 2022-02-09 LAB
BASOPHILS # BLD AUTO: 0.03 K/UL
BASOPHILS NFR BLD AUTO: 0.5 %
CORTIS SERPL-MCNC: 8.3 UG/DL
EOSINOPHIL # BLD AUTO: 0.13 K/UL
EOSINOPHIL NFR BLD AUTO: 2 %
ESTIMATED AVERAGE GLUCOSE: 117 MG/DL
HBA1C MFR BLD HPLC: 5.7 %
HCT VFR BLD CALC: 41.7 %
HGB BLD-MCNC: 13.1 G/DL
IMM GRANULOCYTES NFR BLD AUTO: 0.3 %
LYMPHOCYTES # BLD AUTO: 1.56 K/UL
LYMPHOCYTES NFR BLD AUTO: 23.6 %
MAN DIFF?: NORMAL
MCHC RBC-ENTMCNC: 26.8 PG
MCHC RBC-ENTMCNC: 31.4 GM/DL
MCV RBC AUTO: 85.3 FL
MONOCYTES # BLD AUTO: 0.43 K/UL
MONOCYTES NFR BLD AUTO: 6.5 %
NEUTROPHILS # BLD AUTO: 4.43 K/UL
NEUTROPHILS NFR BLD AUTO: 67.1 %
PLATELET # BLD AUTO: 309 K/UL
RBC # BLD: 4.89 M/UL
RBC # FLD: 12.8 %
WBC # FLD AUTO: 6.6 K/UL

## 2022-02-11 ENCOUNTER — TRANSCRIPTION ENCOUNTER (OUTPATIENT)
Age: 37
End: 2022-02-11

## 2022-02-11 LAB
25(OH)D3 SERPL-MCNC: 35 NG/ML
ACTH SER-ACNC: 19.1 PG/ML
ALBUMIN SERPL ELPH-MCNC: 4.6 G/DL
ALP BLD-CCNC: 77 U/L
ALT SERPL-CCNC: 32 U/L
ANION GAP SERPL CALC-SCNC: 15 MMOL/L
AST SERPL-CCNC: 19 U/L
BILIRUB SERPL-MCNC: 0.4 MG/DL
BUN SERPL-MCNC: 8 MG/DL
CALCIUM SERPL-MCNC: 9.1 MG/DL
CHLORIDE SERPL-SCNC: 104 MMOL/L
CO2 SERPL-SCNC: 23 MMOL/L
CREAT SERPL-MCNC: 0.62 MG/DL
DHEA-S SERPL-MCNC: 205 UG/DL
ESTRADIOL SERPL-MCNC: 26 PG/ML
FSH SERPL-MCNC: 5.7 IU/L
GH SERPL-MCNC: 0.46 NG/ML
GLUCOSE SERPL-MCNC: 92 MG/DL
HCG SERPL QL: NEGATIVE
IGF-1 INTERP: NORMAL
IGF-I BLD-MCNC: 173 NG/ML
INSULIN P FAST SERPL-ACNC: 40.2 UU/ML
LH SERPL-ACNC: 3 IU/L
OSMOLALITY SERPL: 296 MOSMOL/KG
OSMOLALITY UR: 704 MOSM/KG
PAPP-A SERPL-ACNC: <1 MIU/ML
POTASSIUM SERPL-SCNC: 4.3 MMOL/L
PROT SERPL-MCNC: 7.5 G/DL
SHBG SERPL-SCNC: 15.5 NMOL/L
SODIUM SERPL-SCNC: 141 MMOL/L
T4 FREE SERPL-MCNC: 1.2 NG/DL
TSH SERPL-ACNC: 0.61 UIU/ML

## 2022-02-13 LAB
CREAT 24H UR-MCNC: 2.1 G/24 H
CREAT ?TM UR-MCNC: 82 MG/DL
PROLACTIN SERPL-MCNC: 12.1 NG/ML
PROLACTIN SERPL-MCNC: 12.4 NG/ML
PROT ?TM UR-MCNC: 24 HR
SPECIMEN VOL 24H UR: 2525 ML

## 2022-02-14 ENCOUNTER — TRANSCRIPTION ENCOUNTER (OUTPATIENT)
Age: 37
End: 2022-02-14

## 2022-02-14 ENCOUNTER — OUTPATIENT (OUTPATIENT)
Dept: OUTPATIENT SERVICES | Facility: HOSPITAL | Age: 37
LOS: 1 days | End: 2022-02-14

## 2022-02-14 ENCOUNTER — APPOINTMENT (OUTPATIENT)
Dept: MRI IMAGING | Facility: CLINIC | Age: 37
End: 2022-02-14
Payer: COMMERCIAL

## 2022-02-14 DIAGNOSIS — D35.2 BENIGN NEOPLASM OF PITUITARY GLAND: ICD-10-CM

## 2022-02-14 LAB
ANDROST SERPL-MCNC: 130 NG/DL
TESTOST FREE SERPL-MCNC: 5.4 PG/ML
TESTOST SERPL-MCNC: 21.3 NG/DL

## 2022-02-14 PROCEDURE — 70553 MRI BRAIN STEM W/O & W/DYE: CPT | Mod: 26

## 2022-02-15 LAB
17OH-PREG SERPL-MCNC: 49 NG/DL
17OHP SERPL-MCNC: 25 NG/DL

## 2022-02-25 ENCOUNTER — TRANSCRIPTION ENCOUNTER (OUTPATIENT)
Age: 37
End: 2022-02-25

## 2022-02-25 LAB
ALPHA SUBUNIT SERPL-MCNC: <0.15 NG/ML
ANTI-MUELLERIAN HORMONE: 4.52 NG/ML
CORTIS 24H UR-MCNC: 24 H
CORTIS 24H UR-MRATE: 18 MCG/24 H
MONOMERIC PROLACTIN (ICMA)*: 9.98 NG/ML
PERCENT MACROPROLACTIN: 16 %
PROLACTIN, SERUM (ICMA)*: 11.9 NG/ML
SPECIMEN VOL 24H UR: 2525 ML
T4 FREE SERPL DIALY-MCNC: 1.4 NG/DL

## 2022-03-10 NOTE — DISCHARGE NOTE PROVIDER - CARE PROVIDER_API CALL
Update History & Physical    The Patient's History and Physical of 2/25/22 was reviewed and there is no change. Plan:  The risk, benefits, expected outcome, and alternative to the recommended procedure have been discussed with the patient. Patient understands and wants to proceed with the procedure.     Electronically signed by Kendra Helms MD on 3/10/2022 at 12:21 PM Soledad Antonio (MD)  Surgery; Surgical Critical Care  [address], [address] [address]  Phone: [phone]  Fax: [phone]  Follow Up Time: 1 week

## 2022-09-13 ENCOUNTER — RESULT REVIEW (OUTPATIENT)
Age: 37
End: 2022-09-13

## 2022-12-22 ENCOUNTER — APPOINTMENT (OUTPATIENT)
Dept: ULTRASOUND IMAGING | Facility: CLINIC | Age: 37
End: 2022-12-22

## 2022-12-22 ENCOUNTER — APPOINTMENT (OUTPATIENT)
Dept: MAMMOGRAPHY | Facility: CLINIC | Age: 37
End: 2022-12-22

## 2023-04-25 ENCOUNTER — APPOINTMENT (OUTPATIENT)
Dept: CARDIOLOGY | Facility: CLINIC | Age: 38
End: 2023-04-25
Payer: COMMERCIAL

## 2023-04-25 VITALS
OXYGEN SATURATION: 96 % | HEART RATE: 80 BPM | SYSTOLIC BLOOD PRESSURE: 132 MMHG | WEIGHT: 250 LBS | DIASTOLIC BLOOD PRESSURE: 82 MMHG | HEIGHT: 67 IN | BODY MASS INDEX: 39.24 KG/M2

## 2023-04-25 DIAGNOSIS — Z82.49 FAMILY HISTORY OF ISCHEMIC HEART DISEASE AND OTHER DISEASES OF THE CIRCULATORY SYSTEM: ICD-10-CM

## 2023-04-25 DIAGNOSIS — Z87.59 PERSONAL HISTORY OF OTHER COMPLICATIONS OF PREGNANCY, CHILDBIRTH AND THE PUERPERIUM: ICD-10-CM

## 2023-04-25 DIAGNOSIS — D35.2 BENIGN NEOPLASM OF PITUITARY GLAND: ICD-10-CM

## 2023-04-25 PROCEDURE — 99203 OFFICE O/P NEW LOW 30 MIN: CPT

## 2023-04-25 PROCEDURE — 93000 ELECTROCARDIOGRAM COMPLETE: CPT

## 2023-04-25 RX ORDER — PSYLLIUM HUSK 0.4 G
CAPSULE ORAL
Refills: 0 | Status: ACTIVE | COMMUNITY

## 2023-04-25 RX ORDER — ESOMEPRAZOLE MAGNESIUM 20 MG/1
20 CAPSULE, DELAYED RELEASE ORAL
Refills: 0 | Status: ACTIVE | COMMUNITY

## 2023-04-25 RX ORDER — ASPIRIN 81 MG
81 TABLET, DELAYED RELEASE (ENTERIC COATED) ORAL
Refills: 0 | Status: ACTIVE | COMMUNITY

## 2023-04-25 NOTE — HISTORY OF PRESENT ILLNESS
[FreeTextEntry1] : Ms. JAMAAL MARS 37 year old   currently GA 11w6d with prior history of severe PEC  is here 2023 to establish care in the Women's heart health program.\par Patient carries a strong FH of heart disease ( maternal uncle  s/p MI in his 40s, grandpa, with MI, mom  with HTN, history of CVA and personal history of severe PEC and now chronic HTN on labetalol,  pituitary adenoma ( monitored by neuro ). Denies chest pain, shortness of breath, dizziness, lightheadedness, palpitations or near syncope or syncope, orthopnea, PND and increasing lower extremity edema. \par OB history: \par 2018; FT  PP PEC \par : Miscarriage \par : current pregnancy\par \par # Prior history of Severe PEC currently pregnant: \par BP Stable \par Labetalol 300 mg Q8H \par Encouraged Patient to monitor BP at home and keep a log and report results back to us for evaluation. Based on results, we will adjust medications as necessary. \par Additionally, encouraged heart healthy diet and exercise as tolerated.\par EKG with no acute changes.\par

## 2023-04-25 NOTE — DISCUSSION/SUMMARY
[FreeTextEntry1] : Ms. JAMAAL MARS 37 year old   currently GA 11w6d carries a strong FH of heart disease ( maternal uncle  s/p MI in his 40s, grandpa, with MI, mom  with HTN, history of CVA and personal history of severe PEC and now chronic HTN on labetalol, pituitary adenoma ( monitored by neuro ) presents in to establish care in the Women's heart health program.\par \par # History of CHTN and Prior history of Severe PEC currently pregnant: \par BP Stable \par Labetalol 300 mg Q8H \par Start Aspirin 81 mg ( advsied to discuss with OB if should take 1 alternating with 2 tabs) \par Encouraged Patient to monitor BP at home and keep a log and report results back to us for evaluation. Based on results, we will adjust medications as necessary. \par Additionally, encouraged heart healthy diet and exercise as tolerated.\par EKG with no acute changes.\par \par FU 8 weeks

## 2023-05-08 ENCOUNTER — APPOINTMENT (OUTPATIENT)
Dept: ANTEPARTUM | Facility: CLINIC | Age: 38
End: 2023-05-08
Payer: COMMERCIAL

## 2023-05-08 ENCOUNTER — NON-APPOINTMENT (OUTPATIENT)
Age: 38
End: 2023-05-08

## 2023-05-08 ENCOUNTER — ASOB RESULT (OUTPATIENT)
Age: 38
End: 2023-05-08

## 2023-05-08 PROCEDURE — 76813 OB US NUCHAL MEAS 1 GEST: CPT

## 2023-05-08 PROCEDURE — 76801 OB US < 14 WKS SINGLE FETUS: CPT

## 2023-06-26 ENCOUNTER — APPOINTMENT (OUTPATIENT)
Dept: ANTEPARTUM | Facility: CLINIC | Age: 38
End: 2023-06-26
Payer: COMMERCIAL

## 2023-06-26 PROCEDURE — 76811 OB US DETAILED SNGL FETUS: CPT

## 2023-06-27 ENCOUNTER — APPOINTMENT (OUTPATIENT)
Dept: CARDIOLOGY | Facility: CLINIC | Age: 38
End: 2023-06-27

## 2023-07-12 ENCOUNTER — APPOINTMENT (OUTPATIENT)
Dept: ANTEPARTUM | Facility: CLINIC | Age: 38
End: 2023-07-12
Payer: COMMERCIAL

## 2023-07-12 ENCOUNTER — ASOB RESULT (OUTPATIENT)
Age: 38
End: 2023-07-12

## 2023-07-12 PROCEDURE — 76816 OB US FOLLOW-UP PER FETUS: CPT

## 2023-10-20 ENCOUNTER — INPATIENT (INPATIENT)
Facility: HOSPITAL | Age: 38
LOS: 1 days | Discharge: HOME | End: 2023-10-22
Attending: OBSTETRICS & GYNECOLOGY | Admitting: OBSTETRICS & GYNECOLOGY
Payer: COMMERCIAL

## 2023-10-20 ENCOUNTER — TRANSCRIPTION ENCOUNTER (OUTPATIENT)
Age: 38
End: 2023-10-20

## 2023-10-20 VITALS — OXYGEN SATURATION: 97 % | HEART RATE: 107 BPM

## 2023-10-20 DIAGNOSIS — O26.899 OTHER SPECIFIED PREGNANCY RELATED CONDITIONS, UNSPECIFIED TRIMESTER: ICD-10-CM

## 2023-10-20 DIAGNOSIS — Z34.80 ENCOUNTER FOR SUPERVISION OF OTHER NORMAL PREGNANCY, UNSPECIFIED TRIMESTER: ICD-10-CM

## 2023-10-20 DIAGNOSIS — K08.409 PARTIAL LOSS OF TEETH, UNSPECIFIED CAUSE, UNSPECIFIED CLASS: Chronic | ICD-10-CM

## 2023-10-20 DIAGNOSIS — O41.03X0 OLIGOHYDRAMNIOS, THIRD TRIMESTER, NOT APPLICABLE OR UNSPECIFIED: ICD-10-CM

## 2023-10-20 LAB
ALBUMIN SERPL ELPH-MCNC: 3.7 G/DL — SIGNIFICANT CHANGE UP (ref 3.3–5)
ALBUMIN SERPL ELPH-MCNC: 3.7 G/DL — SIGNIFICANT CHANGE UP (ref 3.3–5)
ALP SERPL-CCNC: 115 U/L — SIGNIFICANT CHANGE UP (ref 40–120)
ALP SERPL-CCNC: 115 U/L — SIGNIFICANT CHANGE UP (ref 40–120)
ALT FLD-CCNC: 14 U/L — SIGNIFICANT CHANGE UP (ref 10–45)
ALT FLD-CCNC: 14 U/L — SIGNIFICANT CHANGE UP (ref 10–45)
ANION GAP SERPL CALC-SCNC: 17 MMOL/L — SIGNIFICANT CHANGE UP (ref 5–17)
ANION GAP SERPL CALC-SCNC: 17 MMOL/L — SIGNIFICANT CHANGE UP (ref 5–17)
APPEARANCE UR: CLEAR — SIGNIFICANT CHANGE UP
APPEARANCE UR: CLEAR — SIGNIFICANT CHANGE UP
APTT BLD: 25 SEC — SIGNIFICANT CHANGE UP (ref 24.5–35.6)
APTT BLD: 25 SEC — SIGNIFICANT CHANGE UP (ref 24.5–35.6)
AST SERPL-CCNC: 13 U/L — SIGNIFICANT CHANGE UP (ref 10–40)
AST SERPL-CCNC: 13 U/L — SIGNIFICANT CHANGE UP (ref 10–40)
BACTERIA # UR AUTO: NEGATIVE — SIGNIFICANT CHANGE UP
BACTERIA # UR AUTO: NEGATIVE — SIGNIFICANT CHANGE UP
BASOPHILS # BLD AUTO: 0.03 K/UL — SIGNIFICANT CHANGE UP (ref 0–0.2)
BASOPHILS # BLD AUTO: 0.03 K/UL — SIGNIFICANT CHANGE UP (ref 0–0.2)
BASOPHILS NFR BLD AUTO: 0.3 % — SIGNIFICANT CHANGE UP (ref 0–2)
BASOPHILS NFR BLD AUTO: 0.3 % — SIGNIFICANT CHANGE UP (ref 0–2)
BILIRUB SERPL-MCNC: 0.2 MG/DL — SIGNIFICANT CHANGE UP (ref 0.2–1.2)
BILIRUB SERPL-MCNC: 0.2 MG/DL — SIGNIFICANT CHANGE UP (ref 0.2–1.2)
BILIRUB UR-MCNC: NEGATIVE — SIGNIFICANT CHANGE UP
BILIRUB UR-MCNC: NEGATIVE — SIGNIFICANT CHANGE UP
BLD GP AB SCN SERPL QL: NEGATIVE — SIGNIFICANT CHANGE UP
BLD GP AB SCN SERPL QL: NEGATIVE — SIGNIFICANT CHANGE UP
BUN SERPL-MCNC: 6 MG/DL — LOW (ref 7–23)
BUN SERPL-MCNC: 6 MG/DL — LOW (ref 7–23)
CALCIUM SERPL-MCNC: 9.6 MG/DL — SIGNIFICANT CHANGE UP (ref 8.4–10.5)
CALCIUM SERPL-MCNC: 9.6 MG/DL — SIGNIFICANT CHANGE UP (ref 8.4–10.5)
CHLORIDE SERPL-SCNC: 101 MMOL/L — SIGNIFICANT CHANGE UP (ref 96–108)
CHLORIDE SERPL-SCNC: 101 MMOL/L — SIGNIFICANT CHANGE UP (ref 96–108)
CO2 SERPL-SCNC: 19 MMOL/L — LOW (ref 22–31)
CO2 SERPL-SCNC: 19 MMOL/L — LOW (ref 22–31)
COLOR SPEC: SIGNIFICANT CHANGE UP
COLOR SPEC: SIGNIFICANT CHANGE UP
CREAT ?TM UR-MCNC: 53 MG/DL — SIGNIFICANT CHANGE UP
CREAT ?TM UR-MCNC: 53 MG/DL — SIGNIFICANT CHANGE UP
CREAT SERPL-MCNC: 0.42 MG/DL — LOW (ref 0.5–1.3)
CREAT SERPL-MCNC: 0.42 MG/DL — LOW (ref 0.5–1.3)
DIFF PNL FLD: ABNORMAL
DIFF PNL FLD: ABNORMAL
EGFR: 128 ML/MIN/1.73M2 — SIGNIFICANT CHANGE UP
EGFR: 128 ML/MIN/1.73M2 — SIGNIFICANT CHANGE UP
EOSINOPHIL # BLD AUTO: 0.07 K/UL — SIGNIFICANT CHANGE UP (ref 0–0.5)
EOSINOPHIL # BLD AUTO: 0.07 K/UL — SIGNIFICANT CHANGE UP (ref 0–0.5)
EOSINOPHIL NFR BLD AUTO: 0.7 % — SIGNIFICANT CHANGE UP (ref 0–6)
EOSINOPHIL NFR BLD AUTO: 0.7 % — SIGNIFICANT CHANGE UP (ref 0–6)
EPI CELLS # UR: 1 /HPF — SIGNIFICANT CHANGE UP
EPI CELLS # UR: 1 /HPF — SIGNIFICANT CHANGE UP
FIBRINOGEN PPP-MCNC: 553 MG/DL — HIGH (ref 200–445)
FIBRINOGEN PPP-MCNC: 553 MG/DL — HIGH (ref 200–445)
GLUCOSE SERPL-MCNC: 89 MG/DL — SIGNIFICANT CHANGE UP (ref 70–99)
GLUCOSE SERPL-MCNC: 89 MG/DL — SIGNIFICANT CHANGE UP (ref 70–99)
GLUCOSE UR QL: NEGATIVE — SIGNIFICANT CHANGE UP
GLUCOSE UR QL: NEGATIVE — SIGNIFICANT CHANGE UP
HCT VFR BLD CALC: 37.4 % — SIGNIFICANT CHANGE UP (ref 34.5–45)
HCT VFR BLD CALC: 37.4 % — SIGNIFICANT CHANGE UP (ref 34.5–45)
HGB BLD-MCNC: 12.4 G/DL — SIGNIFICANT CHANGE UP (ref 11.5–15.5)
HGB BLD-MCNC: 12.4 G/DL — SIGNIFICANT CHANGE UP (ref 11.5–15.5)
HYALINE CASTS # UR AUTO: 1 /LPF — SIGNIFICANT CHANGE UP (ref 0–2)
HYALINE CASTS # UR AUTO: 1 /LPF — SIGNIFICANT CHANGE UP (ref 0–2)
IMM GRANULOCYTES NFR BLD AUTO: 2.2 % — HIGH (ref 0–0.9)
IMM GRANULOCYTES NFR BLD AUTO: 2.2 % — HIGH (ref 0–0.9)
INR BLD: 0.99 RATIO — SIGNIFICANT CHANGE UP (ref 0.85–1.18)
INR BLD: 0.99 RATIO — SIGNIFICANT CHANGE UP (ref 0.85–1.18)
KETONES UR-MCNC: NEGATIVE — SIGNIFICANT CHANGE UP
KETONES UR-MCNC: NEGATIVE — SIGNIFICANT CHANGE UP
LDH SERPL L TO P-CCNC: 160 U/L — SIGNIFICANT CHANGE UP (ref 50–242)
LDH SERPL L TO P-CCNC: 160 U/L — SIGNIFICANT CHANGE UP (ref 50–242)
LEUKOCYTE ESTERASE UR-ACNC: NEGATIVE — SIGNIFICANT CHANGE UP
LEUKOCYTE ESTERASE UR-ACNC: NEGATIVE — SIGNIFICANT CHANGE UP
LYMPHOCYTES # BLD AUTO: 1.36 K/UL — SIGNIFICANT CHANGE UP (ref 1–3.3)
LYMPHOCYTES # BLD AUTO: 1.36 K/UL — SIGNIFICANT CHANGE UP (ref 1–3.3)
LYMPHOCYTES # BLD AUTO: 13 % — SIGNIFICANT CHANGE UP (ref 13–44)
LYMPHOCYTES # BLD AUTO: 13 % — SIGNIFICANT CHANGE UP (ref 13–44)
MCHC RBC-ENTMCNC: 27.6 PG — SIGNIFICANT CHANGE UP (ref 27–34)
MCHC RBC-ENTMCNC: 27.6 PG — SIGNIFICANT CHANGE UP (ref 27–34)
MCHC RBC-ENTMCNC: 33.2 GM/DL — SIGNIFICANT CHANGE UP (ref 32–36)
MCHC RBC-ENTMCNC: 33.2 GM/DL — SIGNIFICANT CHANGE UP (ref 32–36)
MCV RBC AUTO: 83.3 FL — SIGNIFICANT CHANGE UP (ref 80–100)
MCV RBC AUTO: 83.3 FL — SIGNIFICANT CHANGE UP (ref 80–100)
MONOCYTES # BLD AUTO: 0.82 K/UL — SIGNIFICANT CHANGE UP (ref 0–0.9)
MONOCYTES # BLD AUTO: 0.82 K/UL — SIGNIFICANT CHANGE UP (ref 0–0.9)
MONOCYTES NFR BLD AUTO: 7.9 % — SIGNIFICANT CHANGE UP (ref 2–14)
MONOCYTES NFR BLD AUTO: 7.9 % — SIGNIFICANT CHANGE UP (ref 2–14)
NEUTROPHILS # BLD AUTO: 7.93 K/UL — HIGH (ref 1.8–7.4)
NEUTROPHILS # BLD AUTO: 7.93 K/UL — HIGH (ref 1.8–7.4)
NEUTROPHILS NFR BLD AUTO: 75.9 % — SIGNIFICANT CHANGE UP (ref 43–77)
NEUTROPHILS NFR BLD AUTO: 75.9 % — SIGNIFICANT CHANGE UP (ref 43–77)
NITRITE UR-MCNC: NEGATIVE — SIGNIFICANT CHANGE UP
NITRITE UR-MCNC: NEGATIVE — SIGNIFICANT CHANGE UP
NRBC # BLD: 0 /100 WBCS — SIGNIFICANT CHANGE UP (ref 0–0)
NRBC # BLD: 0 /100 WBCS — SIGNIFICANT CHANGE UP (ref 0–0)
PH UR: 7.5 — SIGNIFICANT CHANGE UP (ref 5–8)
PH UR: 7.5 — SIGNIFICANT CHANGE UP (ref 5–8)
PLATELET # BLD AUTO: 175 K/UL — SIGNIFICANT CHANGE UP (ref 150–400)
PLATELET # BLD AUTO: 175 K/UL — SIGNIFICANT CHANGE UP (ref 150–400)
POTASSIUM SERPL-MCNC: 4.1 MMOL/L — SIGNIFICANT CHANGE UP (ref 3.5–5.3)
POTASSIUM SERPL-MCNC: 4.1 MMOL/L — SIGNIFICANT CHANGE UP (ref 3.5–5.3)
POTASSIUM SERPL-SCNC: 4.1 MMOL/L — SIGNIFICANT CHANGE UP (ref 3.5–5.3)
POTASSIUM SERPL-SCNC: 4.1 MMOL/L — SIGNIFICANT CHANGE UP (ref 3.5–5.3)
PROT ?TM UR-MCNC: 13 MG/DL — HIGH (ref 0–12)
PROT ?TM UR-MCNC: 13 MG/DL — HIGH (ref 0–12)
PROT SERPL-MCNC: 7.1 G/DL — SIGNIFICANT CHANGE UP (ref 6–8.3)
PROT SERPL-MCNC: 7.1 G/DL — SIGNIFICANT CHANGE UP (ref 6–8.3)
PROT UR-MCNC: SIGNIFICANT CHANGE UP
PROT UR-MCNC: SIGNIFICANT CHANGE UP
PROT/CREAT UR-RTO: 0.2 RATIO — SIGNIFICANT CHANGE UP (ref 0–0.2)
PROT/CREAT UR-RTO: 0.2 RATIO — SIGNIFICANT CHANGE UP (ref 0–0.2)
PROTHROM AB SERPL-ACNC: 10.4 SEC — SIGNIFICANT CHANGE UP (ref 9.5–13)
PROTHROM AB SERPL-ACNC: 10.4 SEC — SIGNIFICANT CHANGE UP (ref 9.5–13)
RBC # BLD: 4.49 M/UL — SIGNIFICANT CHANGE UP (ref 3.8–5.2)
RBC # BLD: 4.49 M/UL — SIGNIFICANT CHANGE UP (ref 3.8–5.2)
RBC # FLD: 14 % — SIGNIFICANT CHANGE UP (ref 10.3–14.5)
RBC # FLD: 14 % — SIGNIFICANT CHANGE UP (ref 10.3–14.5)
RBC CASTS # UR COMP ASSIST: 2 /HPF — SIGNIFICANT CHANGE UP (ref 0–4)
RBC CASTS # UR COMP ASSIST: 2 /HPF — SIGNIFICANT CHANGE UP (ref 0–4)
RH IG SCN BLD-IMP: POSITIVE — SIGNIFICANT CHANGE UP
RH IG SCN BLD-IMP: POSITIVE — SIGNIFICANT CHANGE UP
SODIUM SERPL-SCNC: 137 MMOL/L — SIGNIFICANT CHANGE UP (ref 135–145)
SODIUM SERPL-SCNC: 137 MMOL/L — SIGNIFICANT CHANGE UP (ref 135–145)
SP GR SPEC: 1.01 — SIGNIFICANT CHANGE UP (ref 1.01–1.02)
SP GR SPEC: 1.01 — SIGNIFICANT CHANGE UP (ref 1.01–1.02)
URATE SERPL-MCNC: 3.4 MG/DL — SIGNIFICANT CHANGE UP (ref 2.5–7)
URATE SERPL-MCNC: 3.4 MG/DL — SIGNIFICANT CHANGE UP (ref 2.5–7)
UROBILINOGEN FLD QL: NEGATIVE — SIGNIFICANT CHANGE UP
UROBILINOGEN FLD QL: NEGATIVE — SIGNIFICANT CHANGE UP
WBC # BLD: 10.44 K/UL — SIGNIFICANT CHANGE UP (ref 3.8–10.5)
WBC # BLD: 10.44 K/UL — SIGNIFICANT CHANGE UP (ref 3.8–10.5)
WBC # FLD AUTO: 10.44 K/UL — SIGNIFICANT CHANGE UP (ref 3.8–10.5)
WBC # FLD AUTO: 10.44 K/UL — SIGNIFICANT CHANGE UP (ref 3.8–10.5)
WBC UR QL: 1 /HPF — SIGNIFICANT CHANGE UP (ref 0–5)
WBC UR QL: 1 /HPF — SIGNIFICANT CHANGE UP (ref 0–5)

## 2023-10-20 RX ORDER — OXYTOCIN 10 UNIT/ML
41.67 VIAL (ML) INJECTION
Qty: 20 | Refills: 0 | Status: DISCONTINUED | OUTPATIENT
Start: 2023-10-20 | End: 2023-10-22

## 2023-10-20 RX ORDER — LABETALOL HCL 100 MG
300 TABLET ORAL THREE TIMES A DAY
Refills: 0 | Status: DISCONTINUED | OUTPATIENT
Start: 2023-10-20 | End: 2023-10-22

## 2023-10-20 RX ORDER — OXYCODONE HYDROCHLORIDE 5 MG/1
5 TABLET ORAL ONCE
Refills: 0 | Status: DISCONTINUED | OUTPATIENT
Start: 2023-10-20 | End: 2023-10-22

## 2023-10-20 RX ORDER — ACETAMINOPHEN 500 MG
1000 TABLET ORAL ONCE
Refills: 0 | Status: COMPLETED | OUTPATIENT
Start: 2023-10-20 | End: 2023-10-20

## 2023-10-20 RX ORDER — OXYTOCIN 10 UNIT/ML
10 VIAL (ML) INJECTION ONCE
Refills: 0 | Status: COMPLETED | OUTPATIENT
Start: 2023-10-20 | End: 2023-10-20

## 2023-10-20 RX ORDER — ACETAMINOPHEN 500 MG
975 TABLET ORAL
Refills: 0 | Status: DISCONTINUED | OUTPATIENT
Start: 2023-10-20 | End: 2023-10-22

## 2023-10-20 RX ORDER — CITRIC ACID/SODIUM CITRATE 300-500 MG
15 SOLUTION, ORAL ORAL EVERY 6 HOURS
Refills: 0 | Status: DISCONTINUED | OUTPATIENT
Start: 2023-10-20 | End: 2023-10-20

## 2023-10-20 RX ORDER — KETOROLAC TROMETHAMINE 30 MG/ML
30 SYRINGE (ML) INJECTION ONCE
Refills: 0 | Status: DISCONTINUED | OUTPATIENT
Start: 2023-10-20 | End: 2023-10-20

## 2023-10-20 RX ORDER — SODIUM CHLORIDE 9 MG/ML
1000 INJECTION, SOLUTION INTRAVENOUS
Refills: 0 | Status: DISCONTINUED | OUTPATIENT
Start: 2023-10-20 | End: 2023-10-20

## 2023-10-20 RX ORDER — SIMETHICONE 80 MG/1
80 TABLET, CHEWABLE ORAL EVERY 4 HOURS
Refills: 0 | Status: DISCONTINUED | OUTPATIENT
Start: 2023-10-20 | End: 2023-10-22

## 2023-10-20 RX ORDER — SODIUM CHLORIDE 9 MG/ML
3 INJECTION INTRAMUSCULAR; INTRAVENOUS; SUBCUTANEOUS EVERY 8 HOURS
Refills: 0 | Status: DISCONTINUED | OUTPATIENT
Start: 2023-10-20 | End: 2023-10-22

## 2023-10-20 RX ORDER — OXYTOCIN 10 UNIT/ML
4 VIAL (ML) INJECTION
Qty: 30 | Refills: 0 | Status: DISCONTINUED | OUTPATIENT
Start: 2023-10-20 | End: 2023-10-20

## 2023-10-20 RX ORDER — CHLORHEXIDINE GLUCONATE 213 G/1000ML
1 SOLUTION TOPICAL DAILY
Refills: 0 | Status: DISCONTINUED | OUTPATIENT
Start: 2023-10-20 | End: 2023-10-20

## 2023-10-20 RX ORDER — DIBUCAINE 1 %
1 OINTMENT (GRAM) RECTAL EVERY 6 HOURS
Refills: 0 | Status: DISCONTINUED | OUTPATIENT
Start: 2023-10-20 | End: 2023-10-22

## 2023-10-20 RX ORDER — OXYTOCIN 10 UNIT/ML
333.33 VIAL (ML) INJECTION
Qty: 20 | Refills: 0 | Status: DISCONTINUED | OUTPATIENT
Start: 2023-10-20 | End: 2023-10-20

## 2023-10-20 RX ORDER — MAGNESIUM HYDROXIDE 400 MG/1
30 TABLET, CHEWABLE ORAL
Refills: 0 | Status: DISCONTINUED | OUTPATIENT
Start: 2023-10-20 | End: 2023-10-22

## 2023-10-20 RX ORDER — OXYCODONE HYDROCHLORIDE 5 MG/1
5 TABLET ORAL
Refills: 0 | Status: DISCONTINUED | OUTPATIENT
Start: 2023-10-20 | End: 2023-10-22

## 2023-10-20 RX ORDER — AER TRAVELER 0.5 G/1
1 SOLUTION RECTAL; TOPICAL EVERY 4 HOURS
Refills: 0 | Status: DISCONTINUED | OUTPATIENT
Start: 2023-10-20 | End: 2023-10-22

## 2023-10-20 RX ORDER — LANOLIN
1 OINTMENT (GRAM) TOPICAL EVERY 6 HOURS
Refills: 0 | Status: DISCONTINUED | OUTPATIENT
Start: 2023-10-20 | End: 2023-10-22

## 2023-10-20 RX ORDER — TETANUS TOXOID, REDUCED DIPHTHERIA TOXOID AND ACELLULAR PERTUSSIS VACCINE, ADSORBED 5; 2.5; 8; 8; 2.5 [IU]/.5ML; [IU]/.5ML; UG/.5ML; UG/.5ML; UG/.5ML
0.5 SUSPENSION INTRAMUSCULAR ONCE
Refills: 0 | Status: DISCONTINUED | OUTPATIENT
Start: 2023-10-20 | End: 2023-10-22

## 2023-10-20 RX ORDER — IBUPROFEN 200 MG
600 TABLET ORAL EVERY 6 HOURS
Refills: 0 | Status: COMPLETED | OUTPATIENT
Start: 2023-10-20 | End: 2024-09-17

## 2023-10-20 RX ORDER — BENZOCAINE 10 %
1 GEL (GRAM) MUCOUS MEMBRANE EVERY 6 HOURS
Refills: 0 | Status: DISCONTINUED | OUTPATIENT
Start: 2023-10-20 | End: 2023-10-22

## 2023-10-20 RX ORDER — HYDROCORTISONE 1 %
1 OINTMENT (GRAM) TOPICAL EVERY 6 HOURS
Refills: 0 | Status: DISCONTINUED | OUTPATIENT
Start: 2023-10-20 | End: 2023-10-22

## 2023-10-20 RX ORDER — PRAMOXINE HYDROCHLORIDE 150 MG/15G
1 AEROSOL, FOAM RECTAL EVERY 4 HOURS
Refills: 0 | Status: DISCONTINUED | OUTPATIENT
Start: 2023-10-20 | End: 2023-10-22

## 2023-10-20 RX ORDER — OXYTOCIN 10 UNIT/ML
10 VIAL (ML) INJECTION ONCE
Refills: 0 | Status: DISCONTINUED | OUTPATIENT
Start: 2023-10-20 | End: 2023-10-22

## 2023-10-20 RX ORDER — DIPHENHYDRAMINE HCL 50 MG
25 CAPSULE ORAL EVERY 6 HOURS
Refills: 0 | Status: DISCONTINUED | OUTPATIENT
Start: 2023-10-20 | End: 2023-10-22

## 2023-10-20 RX ORDER — INFLUENZA VIRUS VACCINE 15; 15; 15; 15 UG/.5ML; UG/.5ML; UG/.5ML; UG/.5ML
0.5 SUSPENSION INTRAMUSCULAR ONCE
Refills: 0 | Status: DISCONTINUED | OUTPATIENT
Start: 2023-10-20 | End: 2023-10-22

## 2023-10-20 RX ADMIN — SODIUM CHLORIDE 125 MILLILITER(S): 9 INJECTION, SOLUTION INTRAVENOUS at 12:28

## 2023-10-20 RX ADMIN — Medication 125 MILLIUNIT(S)/MIN: at 23:00

## 2023-10-20 RX ADMIN — Medication 300 MILLIGRAM(S): at 13:03

## 2023-10-20 RX ADMIN — Medication 10 UNIT(S): at 21:34

## 2023-10-20 RX ADMIN — Medication 300 MILLIGRAM(S): at 22:38

## 2023-10-20 RX ADMIN — Medication 4 MILLIUNIT(S)/MIN: at 13:02

## 2023-10-20 RX ADMIN — Medication 1000 MILLIGRAM(S): at 18:14

## 2023-10-20 RX ADMIN — Medication 400 MILLIGRAM(S): at 13:25

## 2023-10-20 RX ADMIN — Medication 30 MILLIGRAM(S): at 22:38

## 2023-10-20 NOTE — OB PROVIDER H&P - PROBLEM SELECTOR PLAN 1
Admit  Routine labs and baseline HELLP labs  IV access and fluids  clear diet  cont efm/toco  pitocin induction,  expectant   d/w Dr. Mccarthy.  NATALIA Lopez

## 2023-10-20 NOTE — DISCHARGE NOTE OB - PLAN OF CARE
After discharge, please stay on pelvic rest for 6 weeks, meaning no sexual intercourse, no tampons and no douching.  No driving for 2 weeks as women can loose a lot of blood during delivery and there is a possibility of being lightheaded/fainting.  No lifting objects heavier than baby for two weeks.  Expect to have vaginal bleeding/spotting for up to six weeks.  The bleeding should get lighter and more white/light brown with time.  For bleeding soaking more than a pad an hour or passing clots greater than the size of your fist, come in to the emergency department. Continue to take your blood pressure at least twice a day, and take your medications as prescribed.  Call your OB if your pressures are greater than 150/100, or if you experience severe or persistent headaches, visual changes, persistent nausea/vomiting, trouble breathing, chest pain, or severe abdominal pain. Please follow up with your OBGYN within 1 week to review your blood pressures.

## 2023-10-20 NOTE — OB PROVIDER LABOR PROGRESS NOTE - NS_SUBJECTIVE/OBJECTIVE_OBGYN_ALL_OB_FT
OB attending     Patient examined
OB attending     Patient examined, AROM, clear    Vital Signs Last 24 Hrs  T(C): 36.8 (20 Oct 2023 18:48), Max: 36.9 (20 Oct 2023 17:17)  T(F): 98.24 (20 Oct 2023 18:48), Max: 98.42 (20 Oct 2023 17:17)  HR: 99 (20 Oct 2023 19:13) (78 - 110)  BP: 154/77 (20 Oct 2023 19:02) (105/53 - 168/74)  BP(mean): --  RR: 18 (20 Oct 2023 18:48) (17 - 18)  SpO2: 98% (20 Oct 2023 19:13) (85% - 100%)    Parameters below as of 20 Oct 2023 11:56  Patient On (Oxygen Delivery Method): room air

## 2023-10-20 NOTE — DISCHARGE NOTE OB - CARE PROVIDER_API CALL
Bita Mccarthy  Obstetrics and Gynecology  [address], [address]  [place] [address]  Phone: [phone]  Fax: [phone]  Follow Up Time: Routine

## 2023-10-20 NOTE — OB PROVIDER DELIVERY SUMMARY - NSSELHIDDEN_OBGYN_ALL_OB_FT
[NS_DeliveryAttending1_OBGYN_ALL_OB_FT:[id number]],[NS_DeliveryAssist1_OBGYN_ALL_OB_FT:[id number]]

## 2023-10-20 NOTE — DISCHARGE NOTE OB - NSDCCONDITION_GEN_ALL_CORE
[Symptom and Test Evaluation] : symptom and test evaluation [FreeTextEntry1] : 72 year old man presents for a follow up and re-evaluation.. Patient is undergoing colon CA treatment at [facility]. He is coming along with his treatment. He completed an echocardiogram and abdominal u/s AAA screening. We are discussing results. Interestingly, frequent PVCs were noted during the test. No syncope. He completed a holter monitor with significant PVC burdern. \par  Stable

## 2023-10-20 NOTE — PRE-ANESTHESIA EVALUATION ADULT - NSANTHPEFT_GEN_ALL_CORE
PHYSICAL EXAM: Pregnancy  GENERAL: NAD, well-developed/nourished and groomed, afebrile  CHEST/LUNG: Clear to auscultation bilaterally; No wheeze/rhonchi/rales  HEART: Regular rate and rhythm; No murmurs, rubs, or gallops  NEUROLOGY: AAO*3, non-focal

## 2023-10-20 NOTE — OB PROVIDER H&P - HISTORY OF PRESENT ILLNESS
37 y/o G [id number] at 37.2 wks ga presents for induction of labor due to oligohydramnios w/ LORETTA 3.8 on ultrasound today.  +FM. GBS neg. EFW 8lb approx.  PNC uncomplicated.  VE today was 3 cm dilated.     all: nkda  meds:  Labetalol 300 mg po TID (7:30a/3:30p/10:30p)             asa 81/162 PO alternating daily            Nexium 20 mg PO daily    pmhx: cHTN -dx after birth in 2018 - followed by Dr. Alvarado.  Pt reports baseline HELLP labs have been normal. Minimal proteinuria.           '18 pituitary adenoma - followed by neuro/endo - MRI  revealed smaller than 2018. No symptoms.   ob: '18 - 37 wk induction for A2  8lb c/b postpartum re-admission 2 weeks later       '21 - MAB tx w/ oral meds  gyn: denies  surg: '19 - lap evita            wisdom teeth  fhx: denies  soc: denies x 3.

## 2023-10-20 NOTE — OB PROVIDER LABOR PROGRESS NOTE - ASSESSMENT
39 y/o G [id number] at 37.2 wks ga presents for induction of labor due to oligohydramnios w/ LORETTA 3.8 on ultrasound today.  +FM. GBS neg. EFW 7.5lbs   #CHTN- on labetalol 300 TID, monitor BPs, labs wnl today   -continue pitocin   -cat 1 tracing  -GBS neg   -s/p epidural    Bita Mccarthy MD 
39 y/o G [id number] at 37.2 wks ga presents for induction of labor due to oligohydramnios w/ LORETTA 3.8 on ultrasound today.  +FM. GBS neg. EFW 7.5lbs   IN active labor  #CHTN- on labetalol 300 TID, monitor BPs, labs wnl today   -continue pitocin   -cat 1 tracing  -GBS neg   -s/p epidural    Bita Mccarthy MD

## 2023-10-20 NOTE — OB RN DELIVERY SUMMARY - NS_SEPSISRSKCALC_OBGYN_ALL_OB_FT
EOS calculated successfully. EOS Risk Factor: 0.5/1000 live births ([facility] national incidence); GA=37w2d; Temp=98.42; ROM=3.5; GBS='Negative'; Antibiotics='No antibiotics or any antibiotics < 2 hrs prior to birth'

## 2023-10-20 NOTE — DISCHARGE NOTE OB - MEDICATION SUMMARY - MEDICATIONS TO STOP TAKING
I will STOP taking the medications listed below when I get home from the hospital:    oxyCODONE 5 mg oral tablet  -- 1 tab(s) by mouth every 6  hours, As needed, Severe Pain (7 - 10) MDD:3 tabs

## 2023-10-20 NOTE — DISCHARGE NOTE OB - PATIENT PORTAL LINK FT
You can access the FollowMyHealth Patient Portal offered by [facility] by registering at the following website: [email or web address]. By joining [employer]’s FollowMyHealth portal, you will also be able to view your health information using other applications (apps) compatible with our system.

## 2023-10-20 NOTE — OB RN PATIENT PROFILE - WEIGHT IN KG
Encounter addended by: Jessica Mckeon DO on: 8/21/2018  2:43 PM<BR>    Actions taken: Sign clinical note, Charge Capture section accepted
122.5

## 2023-10-20 NOTE — OB RN DELIVERY SUMMARY - NSSELHIDDEN_OBGYN_ALL_OB_FT
[NS_DeliveryAttending1_OBGYN_ALL_OB_FT:[id number]],[NS_DeliveryAssist1_OBGYN_ALL_OB_FT:[id number]],[NS_DeliveryRN_OBGYN_ALL_OB_FT:[id number]]

## 2023-10-20 NOTE — DISCHARGE NOTE OB - HOSPITAL COURSE
Pt admitted for IOL for Oligo, had delivery complicated by uterine atony, EBL 500cc, received IM pitocin and rectal cytotec. Pt admitted for IOL for Oligo, had delivery complicated by uterine atony, EBL 500cc, received IM pitocin and rectal cytotec. Postpartum course uncomplicated. Discharged home on PPD2.

## 2023-10-20 NOTE — DISCHARGE NOTE OB - MATERIALS PROVIDED
Breastfeeding Log/Guide to Postpartum Care/Breastfeeding Guide and Packet/Discharge Medication Information for Patients and Families Pocket Guide

## 2023-10-20 NOTE — OB PROVIDER DELIVERY SUMMARY - NSPROVIDERDELIVERYNOTE_OBGYN_ALL_OB_FT
Delivery of a Viable male infant in the MASHA position.     Due to tight outlet and minimizing tearing, an RML was performed to deliver the head, pt aware and verbally consented. Shoulders and body delivered easily. Tight nuchal cordx1 and reduced. Cord was clamped and cut and baby was passed over to peds. Placenta delivered spontaneously and intact with a 3 vessel cord. Fundal massage was given and uterine fundus was found to be firm. Vaginal exam revealed an intact cervix, vaginal walls and sulci. Patient had a RML and 2nd degree perineal laceration which was repaired with a 2.0 and 3.0 vicryl rapide in normal fashion. Excellent hemostasis was noted.   Patient and infant in stable condition. Count was correct x 2.    EBL: 500 Delivery of a Viable male infant in the MASHA position.     Due to tight outlet and minimizing tearing, an RML was performed to deliver the head, pt aware and verbally consented. Shoulders and body delivered easily. Tight nuchal cordx1 and reduced after delivery. Cord was clamped and cut and baby was passed over to peds. Placenta delivered spontaneously and intact with a 3 vessel cord. Fundal massage was given and uterine fundus was found to be atonic, IM pitocin and rectal cytotec given. Vaginal exam revealed an intact cervix, vaginal walls and sulci. Patient had a RML and 2nd degree perineal laceration which was repaired with a 2.0 and 3.0 vicryl rapide in normal fashion. Excellent hemostasis was noted. Patient and infant in stable condition. Count was correct x 2.    EBL: 500

## 2023-10-20 NOTE — DISCHARGE NOTE OB - CARE PLAN
1 Principal Discharge DX:	Normal vaginal delivery  Assessment and plan of treatment:	After discharge, please stay on pelvic rest for 6 weeks, meaning no sexual intercourse, no tampons and no douching.  No driving for 2 weeks as women can loose a lot of blood during delivery and there is a possibility of being lightheaded/fainting.  No lifting objects heavier than baby for two weeks.  Expect to have vaginal bleeding/spotting for up to six weeks.  The bleeding should get lighter and more white/light brown with time.  For bleeding soaking more than a pad an hour or passing clots greater than the size of your fist, come in to the emergency department.  Secondary Diagnosis:	Chronic hypertension  Assessment and plan of treatment:	Continue to take your blood pressure at least twice a day, and take your medications as prescribed.  Call your OB if your pressures are greater than 150/100, or if you experience severe or persistent headaches, visual changes, persistent nausea/vomiting, trouble breathing, chest pain, or severe abdominal pain. Please follow up with your OBGYN within 1 week to review your blood pressures.

## 2023-10-20 NOTE — PRE-ANESTHESIA EVALUATION ADULT - NSANTHOSAYNRD_GEN_A_CORE
No. REANNA screening performed.  STOP BANG Legend: 0-2 = LOW Risk; 3-4 = INTERMEDIATE Risk; 5-8 = HIGH Risk

## 2023-10-20 NOTE — OB RN PATIENT PROFILE - NSICDXPASTMEDICALHX_GEN_ALL_CORE_FT
PAST MEDICAL HISTORY:  Gall stones     Gestational diabetes     Hypertension     Miscarriage     Pituitary adenoma

## 2023-10-20 NOTE — OB RN PATIENT PROFILE - NS PRO PT RIGHT SUPPORT PERSON
Goal Outcome Evaluation:  Plan of Care Reviewed With: patient  Progress: no change  Outcome Summary: pt cont with pain to her abd, prn meds given and somewhat effective, see MAR. pt to have paracentesis tomorrow at bedside, lovenox held starting this evening and will resume after procedure. pt tolerating meals without complications. will cont to monitor.   same name as above

## 2023-10-20 NOTE — OB PROVIDER H&P - NSLOWPPHRISK_OBGYN_A_OB
No previous uterine incision/Pino Pregnancy/Less than or equal to 4 previous vaginal births/No known bleeding disorder/No history of postpartum hemorrhage

## 2023-10-21 LAB
ALBUMIN SERPL ELPH-MCNC: 3 G/DL — LOW (ref 3.3–5)
ALBUMIN SERPL ELPH-MCNC: 3 G/DL — LOW (ref 3.3–5)
ALP SERPL-CCNC: 80 U/L — SIGNIFICANT CHANGE UP (ref 40–120)
ALP SERPL-CCNC: 80 U/L — SIGNIFICANT CHANGE UP (ref 40–120)
ALT FLD-CCNC: 12 U/L — SIGNIFICANT CHANGE UP (ref 10–45)
ALT FLD-CCNC: 12 U/L — SIGNIFICANT CHANGE UP (ref 10–45)
ANION GAP SERPL CALC-SCNC: 12 MMOL/L — SIGNIFICANT CHANGE UP (ref 5–17)
ANION GAP SERPL CALC-SCNC: 12 MMOL/L — SIGNIFICANT CHANGE UP (ref 5–17)
APTT BLD: 24.5 SEC — SIGNIFICANT CHANGE UP (ref 24.5–35.6)
APTT BLD: 24.5 SEC — SIGNIFICANT CHANGE UP (ref 24.5–35.6)
AST SERPL-CCNC: 17 U/L — SIGNIFICANT CHANGE UP (ref 10–40)
AST SERPL-CCNC: 17 U/L — SIGNIFICANT CHANGE UP (ref 10–40)
BASOPHILS # BLD AUTO: 0.02 K/UL — SIGNIFICANT CHANGE UP (ref 0–0.2)
BASOPHILS # BLD AUTO: 0.02 K/UL — SIGNIFICANT CHANGE UP (ref 0–0.2)
BASOPHILS NFR BLD AUTO: 0.2 % — SIGNIFICANT CHANGE UP (ref 0–2)
BASOPHILS NFR BLD AUTO: 0.2 % — SIGNIFICANT CHANGE UP (ref 0–2)
BILIRUB SERPL-MCNC: 0.2 MG/DL — SIGNIFICANT CHANGE UP (ref 0.2–1.2)
BILIRUB SERPL-MCNC: 0.2 MG/DL — SIGNIFICANT CHANGE UP (ref 0.2–1.2)
BUN SERPL-MCNC: 8 MG/DL — SIGNIFICANT CHANGE UP (ref 7–23)
BUN SERPL-MCNC: 8 MG/DL — SIGNIFICANT CHANGE UP (ref 7–23)
CALCIUM SERPL-MCNC: 8.6 MG/DL — SIGNIFICANT CHANGE UP (ref 8.4–10.5)
CALCIUM SERPL-MCNC: 8.6 MG/DL — SIGNIFICANT CHANGE UP (ref 8.4–10.5)
CHLORIDE SERPL-SCNC: 103 MMOL/L — SIGNIFICANT CHANGE UP (ref 96–108)
CHLORIDE SERPL-SCNC: 103 MMOL/L — SIGNIFICANT CHANGE UP (ref 96–108)
CO2 SERPL-SCNC: 20 MMOL/L — LOW (ref 22–31)
CO2 SERPL-SCNC: 20 MMOL/L — LOW (ref 22–31)
CREAT SERPL-MCNC: 0.46 MG/DL — LOW (ref 0.5–1.3)
CREAT SERPL-MCNC: 0.46 MG/DL — LOW (ref 0.5–1.3)
EGFR: 126 ML/MIN/1.73M2 — SIGNIFICANT CHANGE UP
EGFR: 126 ML/MIN/1.73M2 — SIGNIFICANT CHANGE UP
EOSINOPHIL # BLD AUTO: 0.06 K/UL — SIGNIFICANT CHANGE UP (ref 0–0.5)
EOSINOPHIL # BLD AUTO: 0.06 K/UL — SIGNIFICANT CHANGE UP (ref 0–0.5)
EOSINOPHIL NFR BLD AUTO: 0.5 % — SIGNIFICANT CHANGE UP (ref 0–6)
EOSINOPHIL NFR BLD AUTO: 0.5 % — SIGNIFICANT CHANGE UP (ref 0–6)
FIBRINOGEN PPP-MCNC: 392 MG/DL — SIGNIFICANT CHANGE UP (ref 200–445)
FIBRINOGEN PPP-MCNC: 392 MG/DL — SIGNIFICANT CHANGE UP (ref 200–445)
GLUCOSE SERPL-MCNC: 103 MG/DL — HIGH (ref 70–99)
GLUCOSE SERPL-MCNC: 103 MG/DL — HIGH (ref 70–99)
HCT VFR BLD CALC: 31.5 % — LOW (ref 34.5–45)
HCT VFR BLD CALC: 31.5 % — LOW (ref 34.5–45)
HGB BLD-MCNC: 10.3 G/DL — LOW (ref 11.5–15.5)
HGB BLD-MCNC: 10.3 G/DL — LOW (ref 11.5–15.5)
IMM GRANULOCYTES NFR BLD AUTO: 1 % — HIGH (ref 0–0.9)
IMM GRANULOCYTES NFR BLD AUTO: 1 % — HIGH (ref 0–0.9)
INR BLD: 1.03 RATIO — SIGNIFICANT CHANGE UP (ref 0.85–1.18)
INR BLD: 1.03 RATIO — SIGNIFICANT CHANGE UP (ref 0.85–1.18)
LDH SERPL L TO P-CCNC: 151 U/L — SIGNIFICANT CHANGE UP (ref 50–242)
LDH SERPL L TO P-CCNC: 151 U/L — SIGNIFICANT CHANGE UP (ref 50–242)
LYMPHOCYTES # BLD AUTO: 1.56 K/UL — SIGNIFICANT CHANGE UP (ref 1–3.3)
LYMPHOCYTES # BLD AUTO: 1.56 K/UL — SIGNIFICANT CHANGE UP (ref 1–3.3)
LYMPHOCYTES # BLD AUTO: 12.5 % — LOW (ref 13–44)
LYMPHOCYTES # BLD AUTO: 12.5 % — LOW (ref 13–44)
MCHC RBC-ENTMCNC: 27.8 PG — SIGNIFICANT CHANGE UP (ref 27–34)
MCHC RBC-ENTMCNC: 27.8 PG — SIGNIFICANT CHANGE UP (ref 27–34)
MCHC RBC-ENTMCNC: 32.7 GM/DL — SIGNIFICANT CHANGE UP (ref 32–36)
MCHC RBC-ENTMCNC: 32.7 GM/DL — SIGNIFICANT CHANGE UP (ref 32–36)
MCV RBC AUTO: 84.9 FL — SIGNIFICANT CHANGE UP (ref 80–100)
MCV RBC AUTO: 84.9 FL — SIGNIFICANT CHANGE UP (ref 80–100)
MONOCYTES # BLD AUTO: 0.98 K/UL — HIGH (ref 0–0.9)
MONOCYTES # BLD AUTO: 0.98 K/UL — HIGH (ref 0–0.9)
MONOCYTES NFR BLD AUTO: 7.9 % — SIGNIFICANT CHANGE UP (ref 2–14)
MONOCYTES NFR BLD AUTO: 7.9 % — SIGNIFICANT CHANGE UP (ref 2–14)
NEUTROPHILS # BLD AUTO: 9.72 K/UL — HIGH (ref 1.8–7.4)
NEUTROPHILS # BLD AUTO: 9.72 K/UL — HIGH (ref 1.8–7.4)
NEUTROPHILS NFR BLD AUTO: 77.9 % — HIGH (ref 43–77)
NEUTROPHILS NFR BLD AUTO: 77.9 % — HIGH (ref 43–77)
NRBC # BLD: 0 /100 WBCS — SIGNIFICANT CHANGE UP (ref 0–0)
NRBC # BLD: 0 /100 WBCS — SIGNIFICANT CHANGE UP (ref 0–0)
PLATELET # BLD AUTO: 164 K/UL — SIGNIFICANT CHANGE UP (ref 150–400)
PLATELET # BLD AUTO: 164 K/UL — SIGNIFICANT CHANGE UP (ref 150–400)
POTASSIUM SERPL-MCNC: 3.9 MMOL/L — SIGNIFICANT CHANGE UP (ref 3.5–5.3)
POTASSIUM SERPL-MCNC: 3.9 MMOL/L — SIGNIFICANT CHANGE UP (ref 3.5–5.3)
POTASSIUM SERPL-SCNC: 3.9 MMOL/L — SIGNIFICANT CHANGE UP (ref 3.5–5.3)
POTASSIUM SERPL-SCNC: 3.9 MMOL/L — SIGNIFICANT CHANGE UP (ref 3.5–5.3)
PROT SERPL-MCNC: 5.6 G/DL — LOW (ref 6–8.3)
PROT SERPL-MCNC: 5.6 G/DL — LOW (ref 6–8.3)
PROTHROM AB SERPL-ACNC: 10.8 SEC — SIGNIFICANT CHANGE UP (ref 9.5–13)
PROTHROM AB SERPL-ACNC: 10.8 SEC — SIGNIFICANT CHANGE UP (ref 9.5–13)
RBC # BLD: 3.71 M/UL — LOW (ref 3.8–5.2)
RBC # BLD: 3.71 M/UL — LOW (ref 3.8–5.2)
RBC # FLD: 14.2 % — SIGNIFICANT CHANGE UP (ref 10.3–14.5)
RBC # FLD: 14.2 % — SIGNIFICANT CHANGE UP (ref 10.3–14.5)
SODIUM SERPL-SCNC: 135 MMOL/L — SIGNIFICANT CHANGE UP (ref 135–145)
SODIUM SERPL-SCNC: 135 MMOL/L — SIGNIFICANT CHANGE UP (ref 135–145)
T PALLIDUM AB TITR SER: NEGATIVE — SIGNIFICANT CHANGE UP
T PALLIDUM AB TITR SER: NEGATIVE — SIGNIFICANT CHANGE UP
URATE SERPL-MCNC: 4.3 MG/DL — SIGNIFICANT CHANGE UP (ref 2.5–7)
URATE SERPL-MCNC: 4.3 MG/DL — SIGNIFICANT CHANGE UP (ref 2.5–7)
WBC # BLD: 12.47 K/UL — HIGH (ref 3.8–10.5)
WBC # BLD: 12.47 K/UL — HIGH (ref 3.8–10.5)
WBC # FLD AUTO: 12.47 K/UL — HIGH (ref 3.8–10.5)
WBC # FLD AUTO: 12.47 K/UL — HIGH (ref 3.8–10.5)

## 2023-10-21 RX ORDER — IBUPROFEN 200 MG
600 TABLET ORAL EVERY 6 HOURS
Refills: 0 | Status: DISCONTINUED | OUTPATIENT
Start: 2023-10-21 | End: 2023-10-22

## 2023-10-21 RX ADMIN — Medication 975 MILLIGRAM(S): at 21:34

## 2023-10-21 RX ADMIN — Medication 300 MILLIGRAM(S): at 21:36

## 2023-10-21 RX ADMIN — Medication 1 TABLET(S): at 11:25

## 2023-10-21 RX ADMIN — Medication 975 MILLIGRAM(S): at 03:11

## 2023-10-21 RX ADMIN — Medication 600 MILLIGRAM(S): at 05:43

## 2023-10-21 RX ADMIN — Medication 975 MILLIGRAM(S): at 15:50

## 2023-10-21 RX ADMIN — Medication 600 MILLIGRAM(S): at 11:55

## 2023-10-21 RX ADMIN — Medication 600 MILLIGRAM(S): at 11:25

## 2023-10-21 RX ADMIN — Medication 600 MILLIGRAM(S): at 17:34

## 2023-10-21 RX ADMIN — SODIUM CHLORIDE 3 MILLILITER(S): 9 INJECTION INTRAMUSCULAR; INTRAVENOUS; SUBCUTANEOUS at 06:38

## 2023-10-21 RX ADMIN — Medication 600 MILLIGRAM(S): at 23:48

## 2023-10-21 RX ADMIN — Medication 975 MILLIGRAM(S): at 08:45

## 2023-10-21 RX ADMIN — Medication 975 MILLIGRAM(S): at 08:14

## 2023-10-21 RX ADMIN — Medication 975 MILLIGRAM(S): at 02:28

## 2023-10-21 RX ADMIN — Medication 300 MILLIGRAM(S): at 15:20

## 2023-10-21 RX ADMIN — Medication 975 MILLIGRAM(S): at 22:05

## 2023-10-21 RX ADMIN — Medication 125 MILLIUNIT(S)/MIN: at 01:36

## 2023-10-21 RX ADMIN — Medication 300 MILLIGRAM(S): at 06:14

## 2023-10-21 RX ADMIN — Medication 600 MILLIGRAM(S): at 06:15

## 2023-10-21 RX ADMIN — Medication 975 MILLIGRAM(S): at 15:20

## 2023-10-21 RX ADMIN — Medication 600 MILLIGRAM(S): at 17:58

## 2023-10-21 NOTE — PROGRESS NOTE ADULT - ATTENDING COMMENTS
April 15, 2021     Patient: Tressa Antonio   YOB: 2017   Date of Visit: 4/14/2021       To Whom it May Concern:    Tressa Antonio was seen in my clinic on 4/14/2021    Please excuse Tressa for her absence from school on the date listed above to be able to make her appointment. She had a covid PCR test done on 4/14/2021 that was negative. She can return to school/ when symptoms have improved or resolved.     Sincerely,         Zenaida Spear MD    Medical information is confidential and cannot be disclosed without the written consent of the patient or her representative.       Pt seen on am rounds and note reviewed    No concerns, no headache     VSS AF  FUndus firm U-2   Ext: no cords  trace edema    Labs pending  A/P: PPD 1 s/p -chronic HTN on labetalol 300 mg BID  -monitor B  -check labs  continue labetalol-BPs have been normal pp  Ozzy Veras MD

## 2023-10-21 NOTE — CHART NOTE - NSCHARTNOTEFT_GEN_A_CORE
Patient seen for: nutrition consult for GDM postpartum education prior to discharge --> Pt denies hx of GDM for current pregnancy; hx of GDM with previous pregnancy     Source: patient, EMR    Patient is a 39yo female   Admission date: 10/20  Pt plans on breastfeeding infant    Chart reviewed, events noted. Meds/Labs reviewed.    Anthropometrics:  Height: 67 inches  Pre-pregnancy weight: 250 pounds   Weight gain: 20 pounds   Admit/Dosing wt: 270 pounds     Nutrition Diagnosis:   N/A      Goal: Pt able to teach back 2 points of nutrition education provided.     Nutrition Intervention:  Breastfeeding diet education provided to patient and Spouse at bedside    Monitoring:  No other nutrition risks were identified during this encounter.  RD remains available upon request and will follow up per protocol.    Dora Manning, MS, RDN, CDN (Te[employer])

## 2023-10-21 NOTE — PROGRESS NOTE ADULT - SUBJECTIVE AND OBJECTIVE BOX
37yo  PPD#1 s/p   Patient seen and examined at bedside, no acute overnight events. No acute complaints, pain well controlled. Patient is ambulating, voiding spontaneously, passing gas, and tolerating regular diet. Denies CP, SOB, N/V, HA, blurred vision, epigastric pain.    Vital Signs Last 24 Hours  T(C): 36.9 (10-21-23 @ 01:55), Max: 37.2 (10-20-23 @ 22:09)  HR: 96 (10-21-23 @ 01:55) (78 - 128)  BP: 138/82 (10-21-23 @ 01:55) (105/53 - 186/79)  RR: 18 (10-21-23 @ 01:55) (16 - 18)  SpO2: 98% (10-21-23 @ 01:55) (81% - 100%)    Physical Exam:  General: NAD  Abdomen: Soft, non-tender, non-distended, fundus firm  Pelvic: Lochia wnl    Labs:    Blood Type: A Positive  Antibody Screen: Negative  RPR: Negative               12.4   10.44 )-----------( 175      ( 10-20 @ 13:09 )             37.4         MEDICATIONS  (STANDING):  acetaminophen     Tablet .. 975 milliGRAM(s) Oral <User Schedule>  diphtheria/tetanus/pertussis (acellular) Vaccine (Adacel) 0.5 milliLiter(s) IntraMuscular once  ibuprofen  Tablet. 600 milliGRAM(s) Oral every 6 hours  influenza   Vaccine 0.5 milliLiter(s) IntraMuscular once  labetalol 300 milliGRAM(s) Oral three times a day  misoprostol 1000 MICROGram(s) Rectal once  oxytocin Infusion 41.667 milliUNIT(s)/Min (125 mL/Hr) IV Continuous <Continuous>  oxytocin Injectable 10 Unit(s) IntraMuscular once  prenatal multivitamin 1 Tablet(s) Oral daily  sodium chloride 0.9% lock flush 3 milliLiter(s) IV Push every 8 hours    MEDICATIONS  (PRN):  benzocaine 20%/menthol 0.5% Spray 1 Spray(s) Topical every 6 hours PRN for Perineal discomfort  dibucaine 1% Ointment 1 Application(s) Topical every 6 hours PRN Perineal discomfort  diphenhydrAMINE 25 milliGRAM(s) Oral every 6 hours PRN Pruritus  hydrocortisone 1% Cream 1 Application(s) Topical every 6 hours PRN Moderate Pain (4-6)  lanolin Ointment 1 Application(s) Topical every 6 hours PRN nipple soreness  magnesium hydroxide Suspension 30 milliLiter(s) Oral two times a day PRN Constipation  oxyCODONE    IR 5 milliGRAM(s) Oral once PRN Moderate to Severe Pain (4-10)  oxyCODONE    IR 5 milliGRAM(s) Oral every 3 hours PRN Moderate to Severe Pain (4-10)  pramoxine 1%/zinc 5% Cream 1 Application(s) Topical every 4 hours PRN Moderate Pain (4-6)  simethicone 80 milliGRAM(s) Chew every 4 hours PRN Gas  witch hazel Pads 1 Application(s) Topical every 4 hours PRN Perineal discomfort

## 2023-10-22 PROCEDURE — 83615 LACTATE (LD) (LDH) ENZYME: CPT

## 2023-10-22 PROCEDURE — 85384 FIBRINOGEN ACTIVITY: CPT

## 2023-10-22 PROCEDURE — 86780 TREPONEMA PALLIDUM: CPT

## 2023-10-22 PROCEDURE — 86901 BLOOD TYPING SEROLOGIC RH(D): CPT

## 2023-10-22 PROCEDURE — 86850 RBC ANTIBODY SCREEN: CPT

## 2023-10-22 PROCEDURE — 59050 FETAL MONITOR W/REPORT: CPT

## 2023-10-22 PROCEDURE — 86900 BLOOD TYPING SEROLOGIC ABO: CPT

## 2023-10-22 PROCEDURE — 81001 URINALYSIS AUTO W/SCOPE: CPT

## 2023-10-22 PROCEDURE — 80053 COMPREHEN METABOLIC PANEL: CPT

## 2023-10-22 PROCEDURE — 85610 PROTHROMBIN TIME: CPT

## 2023-10-22 PROCEDURE — 85025 COMPLETE CBC W/AUTO DIFF WBC: CPT

## 2023-10-22 PROCEDURE — 85730 THROMBOPLASTIN TIME PARTIAL: CPT

## 2023-10-22 PROCEDURE — 84550 ASSAY OF BLOOD/URIC ACID: CPT

## 2023-10-22 PROCEDURE — 82570 ASSAY OF URINE CREATININE: CPT

## 2023-10-22 PROCEDURE — 84156 ASSAY OF PROTEIN URINE: CPT

## 2023-10-22 RX ORDER — BENZOCAINE AND MENTHOL 5; 1 G/100ML; G/100ML
1 LIQUID ORAL DAILY
Refills: 0 | Status: DISCONTINUED | OUTPATIENT
Start: 2023-10-22 | End: 2023-10-22

## 2023-10-22 RX ORDER — IBUPROFEN 200 MG
1 TABLET ORAL
Qty: 0 | Refills: 0 | DISCHARGE
Start: 2023-10-22

## 2023-10-22 RX ORDER — LABETALOL HCL 100 MG
1 TABLET ORAL
Refills: 0 | DISCHARGE

## 2023-10-22 RX ADMIN — Medication 300 MILLIGRAM(S): at 05:38

## 2023-10-22 RX ADMIN — BENZOCAINE AND MENTHOL 1 LOZENGE: 5; 1 LIQUID ORAL at 05:38

## 2023-10-22 RX ADMIN — Medication 600 MILLIGRAM(S): at 05:38

## 2023-10-22 RX ADMIN — Medication 975 MILLIGRAM(S): at 08:58

## 2023-10-22 RX ADMIN — Medication 600 MILLIGRAM(S): at 00:20

## 2023-10-22 NOTE — PROGRESS NOTE ADULT - ASSESSMENT
37y/o  PPD#2 from  c/b cHTN. Overall, patient stable and recovering well postpartum.    #cHTN  - BPs overnight 120-140/70-80s  - denies severe features   - baseline HELLP labs wnl, P/C 0.2; repeat prn   - continue home Labetalol 300TID  - continue to monitor     #Postpartum state  - Continue with po analgesia  - Increase ambulation  - Continue regular diet  - IV lock  - No labs    Zo Quiroz  PGY-1
39yo  PPD#1 s/p  c/b RML. EBL:500ml. Vitals stable. Pt doing well postpartum    #cHTN  - Lab 300TID  - BPs 120s-130s/50s-80s  - f/u AM HELLP labs  - Monitor VS    #postpartum  - Continue with po analgesia  - Increase ambulation  - Continue regular diet    Ebony Cedillo PGY1

## 2023-10-22 NOTE — LACTATION INITIAL EVALUATION - LACTATION INTERVENTIONS
Mom being discharged today, infant to remain in NICU. Mom has Spectra breast pump at home. Mom denies questions or concerns.  Informed mom of LC availability and encouraged to call with questions or if assistance is desired./initiate/review hand expression/initiate/review pumping guidelines and safe milk handling/post discharge community resources provided/initiate/review supplementation plan due to medical indications

## 2023-10-22 NOTE — PROGRESS NOTE ADULT - SUBJECTIVE AND OBJECTIVE BOX
R1 Progress Note    Patient seen and examined at bedside, no acute overnight events. No acute complaints, pain well controlled. Patient is ambulating, voiding spontaneously, passing gas, and tolerating regular diet. Denies CP, SOB, N/V, HA, blurred vision. Bleeding minimal.    Vital Signs Last 24 Hours  T(C): 36.6 (10-22-23 @ 05:35), Max: 36.6 (10-22-23 @ 05:35)  HR: 88 (10-22-23 @ 05:35) (88 - 95)  BP: 145/85 (10-22-23 @ 05:35) (122/76 - 145/85)  RR: 18 (10-22-23 @ 05:35) (18 - 18)  SpO2: 98% (10-22-23 @ 05:35) (96% - 98%)    Physical Exam:  General: NAD  Abdomen: Soft, non-tender, non-distended, fundus firm  Pelvic: Lochia wnl    Labs:    Blood Type: A Positive  Antibody Screen: Negative  RPR: Negative               10.3   12.47 )-----------( 164      ( 10-21 @ 06:34 )             31.5                12.4   10.44 )-----------( 175      ( 10-20 @ 13:09 )             37.4         MEDICATIONS  (STANDING):  acetaminophen     Tablet .. 975 milliGRAM(s) Oral <User Schedule>  diphtheria/tetanus/pertussis (acellular) Vaccine (Adacel) 0.5 milliLiter(s) IntraMuscular once  ibuprofen  Tablet. 600 milliGRAM(s) Oral every 6 hours  influenza   Vaccine 0.5 milliLiter(s) IntraMuscular once  labetalol 300 milliGRAM(s) Oral three times a day  misoprostol 1000 MICROGram(s) Rectal once  oxytocin Infusion 41.667 milliUNIT(s)/Min (125 mL/Hr) IV Continuous <Continuous>  oxytocin Injectable 10 Unit(s) IntraMuscular once  prenatal multivitamin 1 Tablet(s) Oral daily  sodium chloride 0.9% lock flush 3 milliLiter(s) IV Push every 8 hours    MEDICATIONS  (PRN):  benzocaine 20%/menthol 0.5% Spray 1 Spray(s) Topical every 6 hours PRN for Perineal discomfort  benzocaine/menthol Lozenge 1 Lozenge Oral daily PRN Sore Throat  dibucaine 1% Ointment 1 Application(s) Topical every 6 hours PRN Perineal discomfort  diphenhydrAMINE 25 milliGRAM(s) Oral every 6 hours PRN Pruritus  hydrocortisone 1% Cream 1 Application(s) Topical every 6 hours PRN Moderate Pain (4-6)  lanolin Ointment 1 Application(s) Topical every 6 hours PRN nipple soreness  magnesium hydroxide Suspension 30 milliLiter(s) Oral two times a day PRN Constipation  oxyCODONE    IR 5 milliGRAM(s) Oral every 3 hours PRN Moderate to Severe Pain (4-10)  oxyCODONE    IR 5 milliGRAM(s) Oral once PRN Moderate to Severe Pain (4-10)  pramoxine 1%/zinc 5% Cream 1 Application(s) Topical every 4 hours PRN Moderate Pain (4-6)  simethicone 80 milliGRAM(s) Chew every 4 hours PRN Gas  witch hazel Pads 1 Application(s) Topical every 4 hours PRN Perineal discomfort

## 2023-10-22 NOTE — LACTATION INITIAL EVALUATION - NS_EDDCALCULATED_OBGYN_ALL_OB
LMP/First Trimester Sonogram
Spine appears normal, range of motion is not limited, no muscle or joint tenderness

## 2023-10-24 ENCOUNTER — INPATIENT (INPATIENT)
Facility: HOSPITAL | Age: 38
LOS: 1 days | Discharge: HOME | DRG: 776 | End: 2023-10-26
Attending: OBSTETRICS & GYNECOLOGY | Admitting: OBSTETRICS & GYNECOLOGY
Payer: COMMERCIAL

## 2023-10-24 DIAGNOSIS — K08.409 PARTIAL LOSS OF TEETH, UNSPECIFIED CAUSE, UNSPECIFIED CLASS: Chronic | ICD-10-CM

## 2023-10-25 VITALS
TEMPERATURE: 99 F | SYSTOLIC BLOOD PRESSURE: 176 MMHG | OXYGEN SATURATION: 98 % | RESPIRATION RATE: 18 BRPM | HEART RATE: 81 BPM | DIASTOLIC BLOOD PRESSURE: 80 MMHG

## 2023-10-25 VITALS — HEART RATE: 88 BPM | DIASTOLIC BLOOD PRESSURE: 83 MMHG | SYSTOLIC BLOOD PRESSURE: 172 MMHG

## 2023-10-25 PROBLEM — O03.9 COMPLETE OR UNSPECIFIED SPONTANEOUS ABORTION WITHOUT COMPLICATION: Chronic | Status: ACTIVE | Noted: 2023-10-20

## 2023-10-25 PROBLEM — D35.2 BENIGN NEOPLASM OF PITUITARY GLAND: Chronic | Status: ACTIVE | Noted: 2023-10-20

## 2023-10-25 LAB
ALBUMIN SERPL ELPH-MCNC: 3.5 G/DL — SIGNIFICANT CHANGE UP (ref 3.3–5)
ALBUMIN SERPL ELPH-MCNC: 3.5 G/DL — SIGNIFICANT CHANGE UP (ref 3.3–5)
ALP SERPL-CCNC: 95 U/L — SIGNIFICANT CHANGE UP (ref 40–120)
ALP SERPL-CCNC: 95 U/L — SIGNIFICANT CHANGE UP (ref 40–120)
ALT FLD-CCNC: 38 U/L — SIGNIFICANT CHANGE UP (ref 10–45)
ALT FLD-CCNC: 38 U/L — SIGNIFICANT CHANGE UP (ref 10–45)
ANION GAP SERPL CALC-SCNC: 15 MMOL/L — SIGNIFICANT CHANGE UP (ref 5–17)
ANION GAP SERPL CALC-SCNC: 15 MMOL/L — SIGNIFICANT CHANGE UP (ref 5–17)
APPEARANCE UR: CLEAR — SIGNIFICANT CHANGE UP
APPEARANCE UR: CLEAR — SIGNIFICANT CHANGE UP
APTT BLD: 26.2 SEC — SIGNIFICANT CHANGE UP (ref 24.5–35.6)
APTT BLD: 26.2 SEC — SIGNIFICANT CHANGE UP (ref 24.5–35.6)
AST SERPL-CCNC: 35 U/L — SIGNIFICANT CHANGE UP (ref 10–40)
AST SERPL-CCNC: 35 U/L — SIGNIFICANT CHANGE UP (ref 10–40)
BACTERIA # UR AUTO: NEGATIVE — SIGNIFICANT CHANGE UP
BACTERIA # UR AUTO: NEGATIVE — SIGNIFICANT CHANGE UP
BASOPHILS # BLD AUTO: 0.05 K/UL — SIGNIFICANT CHANGE UP (ref 0–0.2)
BASOPHILS # BLD AUTO: 0.05 K/UL — SIGNIFICANT CHANGE UP (ref 0–0.2)
BASOPHILS NFR BLD AUTO: 0.5 % — SIGNIFICANT CHANGE UP (ref 0–2)
BASOPHILS NFR BLD AUTO: 0.5 % — SIGNIFICANT CHANGE UP (ref 0–2)
BILIRUB SERPL-MCNC: 0.2 MG/DL — SIGNIFICANT CHANGE UP (ref 0.2–1.2)
BILIRUB SERPL-MCNC: 0.2 MG/DL — SIGNIFICANT CHANGE UP (ref 0.2–1.2)
BILIRUB UR-MCNC: NEGATIVE — SIGNIFICANT CHANGE UP
BILIRUB UR-MCNC: NEGATIVE — SIGNIFICANT CHANGE UP
BUN SERPL-MCNC: 10 MG/DL — SIGNIFICANT CHANGE UP (ref 7–23)
BUN SERPL-MCNC: 10 MG/DL — SIGNIFICANT CHANGE UP (ref 7–23)
CALCIUM SERPL-MCNC: 9 MG/DL — SIGNIFICANT CHANGE UP (ref 8.4–10.5)
CALCIUM SERPL-MCNC: 9 MG/DL — SIGNIFICANT CHANGE UP (ref 8.4–10.5)
CHLORIDE SERPL-SCNC: 102 MMOL/L — SIGNIFICANT CHANGE UP (ref 96–108)
CHLORIDE SERPL-SCNC: 102 MMOL/L — SIGNIFICANT CHANGE UP (ref 96–108)
CO2 SERPL-SCNC: 22 MMOL/L — SIGNIFICANT CHANGE UP (ref 22–31)
CO2 SERPL-SCNC: 22 MMOL/L — SIGNIFICANT CHANGE UP (ref 22–31)
COLOR SPEC: COLORLESS — SIGNIFICANT CHANGE UP
COLOR SPEC: COLORLESS — SIGNIFICANT CHANGE UP
CREAT ?TM UR-MCNC: 25 MG/DL — SIGNIFICANT CHANGE UP
CREAT SERPL-MCNC: 0.47 MG/DL — LOW (ref 0.5–1.3)
CREAT SERPL-MCNC: 0.47 MG/DL — LOW (ref 0.5–1.3)
DIFF PNL FLD: ABNORMAL
DIFF PNL FLD: ABNORMAL
EGFR: 125 ML/MIN/1.73M2 — SIGNIFICANT CHANGE UP
EGFR: 125 ML/MIN/1.73M2 — SIGNIFICANT CHANGE UP
EOSINOPHIL # BLD AUTO: 0.21 K/UL — SIGNIFICANT CHANGE UP (ref 0–0.5)
EOSINOPHIL # BLD AUTO: 0.21 K/UL — SIGNIFICANT CHANGE UP (ref 0–0.5)
EOSINOPHIL NFR BLD AUTO: 2 % — SIGNIFICANT CHANGE UP (ref 0–6)
EOSINOPHIL NFR BLD AUTO: 2 % — SIGNIFICANT CHANGE UP (ref 0–6)
EPI CELLS # UR: 0 /HPF — SIGNIFICANT CHANGE UP
EPI CELLS # UR: 0 /HPF — SIGNIFICANT CHANGE UP
FIBRINOGEN PPP-MCNC: 342 MG/DL — SIGNIFICANT CHANGE UP (ref 200–445)
FIBRINOGEN PPP-MCNC: 342 MG/DL — SIGNIFICANT CHANGE UP (ref 200–445)
GLUCOSE SERPL-MCNC: 106 MG/DL — HIGH (ref 70–99)
GLUCOSE SERPL-MCNC: 106 MG/DL — HIGH (ref 70–99)
GLUCOSE UR QL: NEGATIVE — SIGNIFICANT CHANGE UP
GLUCOSE UR QL: NEGATIVE — SIGNIFICANT CHANGE UP
HCT VFR BLD CALC: 30.6 % — LOW (ref 34.5–45)
HCT VFR BLD CALC: 30.6 % — LOW (ref 34.5–45)
HGB BLD-MCNC: 9.9 G/DL — LOW (ref 11.5–15.5)
HGB BLD-MCNC: 9.9 G/DL — LOW (ref 11.5–15.5)
HYALINE CASTS # UR AUTO: 0 /LPF — SIGNIFICANT CHANGE UP (ref 0–2)
HYALINE CASTS # UR AUTO: 0 /LPF — SIGNIFICANT CHANGE UP (ref 0–2)
IMM GRANULOCYTES NFR BLD AUTO: 1.8 % — HIGH (ref 0–0.9)
IMM GRANULOCYTES NFR BLD AUTO: 1.8 % — HIGH (ref 0–0.9)
INR BLD: 0.9 RATIO — SIGNIFICANT CHANGE UP (ref 0.85–1.18)
INR BLD: 0.9 RATIO — SIGNIFICANT CHANGE UP (ref 0.85–1.18)
KETONES UR-MCNC: NEGATIVE — SIGNIFICANT CHANGE UP
KETONES UR-MCNC: NEGATIVE — SIGNIFICANT CHANGE UP
LDH SERPL L TO P-CCNC: 254 U/L — HIGH (ref 50–242)
LDH SERPL L TO P-CCNC: 254 U/L — HIGH (ref 50–242)
LEUKOCYTE ESTERASE UR-ACNC: ABNORMAL
LEUKOCYTE ESTERASE UR-ACNC: ABNORMAL
LYMPHOCYTES # BLD AUTO: 2.23 K/UL — SIGNIFICANT CHANGE UP (ref 1–3.3)
LYMPHOCYTES # BLD AUTO: 2.23 K/UL — SIGNIFICANT CHANGE UP (ref 1–3.3)
LYMPHOCYTES # BLD AUTO: 21.7 % — SIGNIFICANT CHANGE UP (ref 13–44)
LYMPHOCYTES # BLD AUTO: 21.7 % — SIGNIFICANT CHANGE UP (ref 13–44)
MAGNESIUM SERPL-MCNC: 3.9 MG/DL — HIGH (ref 1.6–2.6)
MAGNESIUM SERPL-MCNC: 3.9 MG/DL — HIGH (ref 1.6–2.6)
MAGNESIUM SERPL-MCNC: 4.7 MG/DL — HIGH (ref 1.6–2.6)
MAGNESIUM SERPL-MCNC: 4.7 MG/DL — HIGH (ref 1.6–2.6)
MAGNESIUM SERPL-MCNC: 4.8 MG/DL — HIGH (ref 1.6–2.6)
MAGNESIUM SERPL-MCNC: 4.8 MG/DL — HIGH (ref 1.6–2.6)
MCHC RBC-ENTMCNC: 27.5 PG — SIGNIFICANT CHANGE UP (ref 27–34)
MCHC RBC-ENTMCNC: 27.5 PG — SIGNIFICANT CHANGE UP (ref 27–34)
MCHC RBC-ENTMCNC: 32.4 GM/DL — SIGNIFICANT CHANGE UP (ref 32–36)
MCHC RBC-ENTMCNC: 32.4 GM/DL — SIGNIFICANT CHANGE UP (ref 32–36)
MCV RBC AUTO: 85 FL — SIGNIFICANT CHANGE UP (ref 80–100)
MCV RBC AUTO: 85 FL — SIGNIFICANT CHANGE UP (ref 80–100)
MONOCYTES # BLD AUTO: 0.6 K/UL — SIGNIFICANT CHANGE UP (ref 0–0.9)
MONOCYTES # BLD AUTO: 0.6 K/UL — SIGNIFICANT CHANGE UP (ref 0–0.9)
MONOCYTES NFR BLD AUTO: 5.8 % — SIGNIFICANT CHANGE UP (ref 2–14)
MONOCYTES NFR BLD AUTO: 5.8 % — SIGNIFICANT CHANGE UP (ref 2–14)
NEUTROPHILS # BLD AUTO: 7.01 K/UL — SIGNIFICANT CHANGE UP (ref 1.8–7.4)
NEUTROPHILS # BLD AUTO: 7.01 K/UL — SIGNIFICANT CHANGE UP (ref 1.8–7.4)
NEUTROPHILS NFR BLD AUTO: 68.2 % — SIGNIFICANT CHANGE UP (ref 43–77)
NEUTROPHILS NFR BLD AUTO: 68.2 % — SIGNIFICANT CHANGE UP (ref 43–77)
NITRITE UR-MCNC: NEGATIVE — SIGNIFICANT CHANGE UP
NITRITE UR-MCNC: NEGATIVE — SIGNIFICANT CHANGE UP
NRBC # BLD: 0 /100 WBCS — SIGNIFICANT CHANGE UP (ref 0–0)
NRBC # BLD: 0 /100 WBCS — SIGNIFICANT CHANGE UP (ref 0–0)
PH UR: 6.5 — SIGNIFICANT CHANGE UP (ref 5–8)
PH UR: 6.5 — SIGNIFICANT CHANGE UP (ref 5–8)
PLATELET # BLD AUTO: 184 K/UL — SIGNIFICANT CHANGE UP (ref 150–400)
PLATELET # BLD AUTO: 184 K/UL — SIGNIFICANT CHANGE UP (ref 150–400)
POTASSIUM SERPL-MCNC: 4 MMOL/L — SIGNIFICANT CHANGE UP (ref 3.5–5.3)
POTASSIUM SERPL-MCNC: 4 MMOL/L — SIGNIFICANT CHANGE UP (ref 3.5–5.3)
POTASSIUM SERPL-SCNC: 4 MMOL/L — SIGNIFICANT CHANGE UP (ref 3.5–5.3)
POTASSIUM SERPL-SCNC: 4 MMOL/L — SIGNIFICANT CHANGE UP (ref 3.5–5.3)
PROT ?TM UR-MCNC: <7 MG/DL — SIGNIFICANT CHANGE UP (ref 0–12)
PROT ?TM UR-MCNC: <7 MG/DL — SIGNIFICANT CHANGE UP (ref 0–12)
PROT SERPL-MCNC: 6.8 G/DL — SIGNIFICANT CHANGE UP (ref 6–8.3)
PROT SERPL-MCNC: 6.8 G/DL — SIGNIFICANT CHANGE UP (ref 6–8.3)
PROT UR-MCNC: NEGATIVE — SIGNIFICANT CHANGE UP
PROT UR-MCNC: NEGATIVE — SIGNIFICANT CHANGE UP
PROT/CREAT UR-RTO: <0.3 RATIO — HIGH (ref 0–0.2)
PROT/CREAT UR-RTO: <0.3 RATIO — HIGH (ref 0–0.2)
PROTHROM AB SERPL-ACNC: 9.5 SEC — SIGNIFICANT CHANGE UP (ref 9.5–13)
PROTHROM AB SERPL-ACNC: 9.5 SEC — SIGNIFICANT CHANGE UP (ref 9.5–13)
RBC # BLD: 3.6 M/UL — LOW (ref 3.8–5.2)
RBC # BLD: 3.6 M/UL — LOW (ref 3.8–5.2)
RBC # FLD: 14.2 % — SIGNIFICANT CHANGE UP (ref 10.3–14.5)
RBC # FLD: 14.2 % — SIGNIFICANT CHANGE UP (ref 10.3–14.5)
RBC CASTS # UR COMP ASSIST: 1 /HPF — SIGNIFICANT CHANGE UP (ref 0–4)
RBC CASTS # UR COMP ASSIST: 1 /HPF — SIGNIFICANT CHANGE UP (ref 0–4)
SODIUM SERPL-SCNC: 139 MMOL/L — SIGNIFICANT CHANGE UP (ref 135–145)
SODIUM SERPL-SCNC: 139 MMOL/L — SIGNIFICANT CHANGE UP (ref 135–145)
SP GR SPEC: 1.01 — LOW (ref 1.01–1.02)
SP GR SPEC: 1.01 — LOW (ref 1.01–1.02)
URATE SERPL-MCNC: 4.3 MG/DL — SIGNIFICANT CHANGE UP (ref 2.5–7)
URATE SERPL-MCNC: 4.3 MG/DL — SIGNIFICANT CHANGE UP (ref 2.5–7)
UROBILINOGEN FLD QL: NEGATIVE — SIGNIFICANT CHANGE UP
UROBILINOGEN FLD QL: NEGATIVE — SIGNIFICANT CHANGE UP
WBC # BLD: 10.29 K/UL — SIGNIFICANT CHANGE UP (ref 3.8–10.5)
WBC # BLD: 10.29 K/UL — SIGNIFICANT CHANGE UP (ref 3.8–10.5)
WBC # FLD AUTO: 10.29 K/UL — SIGNIFICANT CHANGE UP (ref 3.8–10.5)
WBC # FLD AUTO: 10.29 K/UL — SIGNIFICANT CHANGE UP (ref 3.8–10.5)
WBC UR QL: 2 /HPF — SIGNIFICANT CHANGE UP (ref 0–5)
WBC UR QL: 2 /HPF — SIGNIFICANT CHANGE UP (ref 0–5)

## 2023-10-25 RX ORDER — LABETALOL HCL 100 MG
1 TABLET ORAL
Refills: 0 | DISCHARGE

## 2023-10-25 RX ORDER — NIFEDIPINE 30 MG
10 TABLET, EXTENDED RELEASE 24 HR ORAL ONCE
Refills: 0 | Status: COMPLETED | OUTPATIENT
Start: 2023-10-25 | End: 2023-10-25

## 2023-10-25 RX ORDER — LABETALOL HCL 100 MG
300 TABLET ORAL EVERY 8 HOURS
Refills: 0 | Status: DISCONTINUED | OUTPATIENT
Start: 2023-10-25 | End: 2023-10-26

## 2023-10-25 RX ORDER — IBUPROFEN 200 MG
600 TABLET ORAL EVERY 6 HOURS
Refills: 0 | Status: DISCONTINUED | OUTPATIENT
Start: 2023-10-25 | End: 2023-10-26

## 2023-10-25 RX ORDER — INFLUENZA VIRUS VACCINE 15; 15; 15; 15 UG/.5ML; UG/.5ML; UG/.5ML; UG/.5ML
0.5 SUSPENSION INTRAMUSCULAR ONCE
Refills: 0 | Status: DISCONTINUED | OUTPATIENT
Start: 2023-10-25 | End: 2023-10-26

## 2023-10-25 RX ORDER — ACETAMINOPHEN 500 MG
975 TABLET ORAL EVERY 6 HOURS
Refills: 0 | Status: DISCONTINUED | OUTPATIENT
Start: 2023-10-25 | End: 2023-10-26

## 2023-10-25 RX ORDER — MAGNESIUM SULFATE 500 MG/ML
2 VIAL (ML) INJECTION
Qty: 40 | Refills: 0 | Status: DISCONTINUED | OUTPATIENT
Start: 2023-10-25 | End: 2023-10-25

## 2023-10-25 RX ORDER — HEPARIN SODIUM 5000 [USP'U]/ML
5000 INJECTION INTRAVENOUS; SUBCUTANEOUS EVERY 12 HOURS
Refills: 0 | Status: DISCONTINUED | OUTPATIENT
Start: 2023-10-25 | End: 2023-10-26

## 2023-10-25 RX ORDER — MAGNESIUM SULFATE 500 MG/ML
4 VIAL (ML) INJECTION ONCE
Refills: 0 | Status: COMPLETED | OUTPATIENT
Start: 2023-10-25 | End: 2023-10-25

## 2023-10-25 RX ORDER — PANTOPRAZOLE SODIUM 20 MG/1
40 TABLET, DELAYED RELEASE ORAL
Refills: 0 | Status: DISCONTINUED | OUTPATIENT
Start: 2023-10-25 | End: 2023-10-26

## 2023-10-25 RX ORDER — SODIUM CHLORIDE 9 MG/ML
1000 INJECTION, SOLUTION INTRAVENOUS
Refills: 0 | Status: DISCONTINUED | OUTPATIENT
Start: 2023-10-25 | End: 2023-10-26

## 2023-10-25 RX ORDER — MAGNESIUM SULFATE 500 MG/ML
2 VIAL (ML) INJECTION
Qty: 40 | Refills: 0 | Status: DISCONTINUED | OUTPATIENT
Start: 2023-10-25 | End: 2023-10-26

## 2023-10-25 RX ORDER — NIFEDIPINE 30 MG
30 TABLET, EXTENDED RELEASE 24 HR ORAL DAILY
Refills: 0 | Status: DISCONTINUED | OUTPATIENT
Start: 2023-10-25 | End: 2023-10-26

## 2023-10-25 RX ADMIN — Medication 600 MILLIGRAM(S): at 20:24

## 2023-10-25 RX ADMIN — Medication 600 MILLIGRAM(S): at 14:15

## 2023-10-25 RX ADMIN — Medication 600 MILLIGRAM(S): at 04:16

## 2023-10-25 RX ADMIN — Medication 975 MILLIGRAM(S): at 09:33

## 2023-10-25 RX ADMIN — Medication 30 MILLIGRAM(S): at 00:57

## 2023-10-25 RX ADMIN — Medication 300 MILLIGRAM(S): at 12:23

## 2023-10-25 RX ADMIN — SODIUM CHLORIDE 50 MILLILITER(S): 9 INJECTION, SOLUTION INTRAVENOUS at 17:26

## 2023-10-25 RX ADMIN — Medication 1 TABLET(S): at 12:22

## 2023-10-25 RX ADMIN — Medication 300 MILLIGRAM(S): at 20:24

## 2023-10-25 RX ADMIN — Medication 600 MILLIGRAM(S): at 13:23

## 2023-10-25 RX ADMIN — Medication 600 MILLIGRAM(S): at 20:54

## 2023-10-25 RX ADMIN — Medication 50 GM/HR: at 01:33

## 2023-10-25 RX ADMIN — Medication 300 MILLIGRAM(S): at 05:00

## 2023-10-25 RX ADMIN — Medication 10 MILLIGRAM(S): at 00:54

## 2023-10-25 RX ADMIN — Medication 50 GM/HR: at 17:26

## 2023-10-25 RX ADMIN — Medication 600 MILLIGRAM(S): at 03:46

## 2023-10-25 RX ADMIN — HEPARIN SODIUM 5000 UNIT(S): 5000 INJECTION INTRAVENOUS; SUBCUTANEOUS at 09:33

## 2023-10-25 RX ADMIN — Medication 10 MILLIGRAM(S): at 00:28

## 2023-10-25 RX ADMIN — HEPARIN SODIUM 5000 UNIT(S): 5000 INJECTION INTRAVENOUS; SUBCUTANEOUS at 20:24

## 2023-10-25 RX ADMIN — Medication 975 MILLIGRAM(S): at 10:15

## 2023-10-25 RX ADMIN — Medication 300 GRAM(S): at 01:11

## 2023-10-25 NOTE — H&P ADULT - NSHPPHYSICALEXAM_GEN_ALL_CORE
Vital Signs Last 24 Hrs  T(C): 37 (25 Oct 2023 00:02), Max: 37 (25 Oct 2023 00:02)  T(F): 98.6 (25 Oct 2023 00:02), Max: 98.6 (25 Oct 2023 00:02)  HR: 80 (25 Oct 2023 00:40) (75 - 88)  BP: 168/79 (25 Oct 2023 00:40) (150/82 - 176/80)  BP(mean): --  ABP: --  ABP(mean): --  RR: 18 (25 Oct 2023 00:02) (18 - 18)  SpO2: 100% (25 Oct 2023 00:14) (98% - 100%)    O2 Parameters below as of 25 Oct 2023 00:02  Patient On (Oxygen Delivery Method): room air    Physical Exam:  GENERAL: NAD, non-toxic appearing  CARDIAC: well perfused  PULM: breathing comfortably on RA  GI: Abdomen nondistended, soft, nontender, no guarding or rebound tenderness  NEURO: AAO x 3  MSK: Moving 4 extremities, no visible deformities  SKIN: No visible rashes, dry, well-perfused  PSYCH: Appropriate mood and affect

## 2023-10-25 NOTE — PROGRESS NOTE ADULT - ASSESSMENT
A/P: 37yo PPD#5 s/p , now readmitted with PP siPEC on Mg. Patient meeting criteria with severe-range BPs, s/p Procardia 10 IR x2 on admission. Patient now on magnesium for seizure ppx and procardia 30XL in addition to home labetalol 300 TID. Patient is stable and doing well post-partum.      #siPEC  - BPs now 130/60-80s  - s/p Procardia 10 IR x2   - c/w home labetalol 300 TID (of note patient took extra 600mg labetalol prior to presentation) and procardia 30xl  - HELLP wnl    #Postpartum  - Pain well controlled, continue current pain regimen  - Increase ambulation, SCDs when not ambulating  - Continue regular diet  - Discharge planning     JANE Lau PGY-3

## 2023-10-25 NOTE — PROGRESS NOTE ADULT - ATTENDING COMMENTS
Pt readmitted for Chronic HTN with superimposed sPEC  She feels well  VSS AF  Fundus firm U-4  Ext: 2+ DTR, trace edema    A/P:37yo PPD#5 s/p , now readmitted with PP sPEC on Mg. Patient meeting criteria with severe-range BPs, s/p Procardia 10 IR x2 on admission. Patient now on magnesium for seizure ppx and procardia 30XL in addition to home labetalol 300 TID. Patient is stable and doing well post-partum.      #siPEC  - BPs now 130/60-80s  - s/p Procardia 10 IR x2   - c/w home labetalol 300 TID (of note patient took extra 600mg labetalol prior to presentation) and procardia 30xl  - HELLP wnl    Ozzy Veras md

## 2023-10-25 NOTE — H&P ADULT - ASSESSMENT
38y  PPD#5 s/p  c/b cHTN admitted for postpartum super-imposed pre-eclampsia.     - In triage, pt with severe range bp, given Pro10 IR  - Pt took Labetalol 300/300/300/600 today  - Add Procardia 30 XL to Labetalol 300mg regimen  - HELLP labs  - Start Mg  - regular diet    D/w Dr. Fabio Garcia PGY2

## 2023-10-25 NOTE — H&P ADULT - HISTORY OF PRESENT ILLNESS
38y  PPD#5 s/p  c/b cHTN presents to triage with elevated bp at home. Pt has been taking her bp at home and taking labetalol 300mg TID. Her bp has been 130s/80s until noon when it was 150/80s. Later in the afternoon it crept up to 160s/90-100s, prompting patient to come in. Pt took Labetalol 300mg and 7am, 12pm, and 4pm, but then took Labetalol 600mg at 8:45pm in an attempt to bring down her blood pressures. In L&D triage, pt with multiple severe range blood pressures 170s/80s. She denies headache, scotoma, RUQ pain.     ob: '18 - 37 wk induction for A2  8lb c/b postpartum re-admission 2 weeks later       '21 - MAB tx w/ oral meds  gyn: denies  pmhx: cHTN -dx after birth in 2018 - followed by Dr. Alvarado.  Pt reports baseline HELLP labs have been normal. Minimal proteinuria. 2018 pituitary adenoma - followed by neuro/endo - MRI 2020 revealed smaller than 2018. No symptoms.   all: nkda  meds:  Labetalol 300 mg po TID            Nexium 20 mg PO daily    surg: '19 - lap evita            wisdom teeth  fhx: denies  soc: denies x 3.

## 2023-10-26 ENCOUNTER — TRANSCRIPTION ENCOUNTER (OUTPATIENT)
Age: 38
End: 2023-10-26

## 2023-10-26 VITALS
TEMPERATURE: 98 F | OXYGEN SATURATION: 98 % | HEART RATE: 92 BPM | DIASTOLIC BLOOD PRESSURE: 84 MMHG | SYSTOLIC BLOOD PRESSURE: 139 MMHG | RESPIRATION RATE: 18 BRPM

## 2023-10-26 PROCEDURE — 84550 ASSAY OF BLOOD/URIC ACID: CPT

## 2023-10-26 PROCEDURE — 36415 COLL VENOUS BLD VENIPUNCTURE: CPT

## 2023-10-26 PROCEDURE — 81001 URINALYSIS AUTO W/SCOPE: CPT

## 2023-10-26 PROCEDURE — 85610 PROTHROMBIN TIME: CPT

## 2023-10-26 PROCEDURE — 83735 ASSAY OF MAGNESIUM: CPT

## 2023-10-26 PROCEDURE — 84156 ASSAY OF PROTEIN URINE: CPT

## 2023-10-26 PROCEDURE — 85384 FIBRINOGEN ACTIVITY: CPT

## 2023-10-26 PROCEDURE — 83615 LACTATE (LD) (LDH) ENZYME: CPT

## 2023-10-26 PROCEDURE — 80053 COMPREHEN METABOLIC PANEL: CPT

## 2023-10-26 PROCEDURE — 82570 ASSAY OF URINE CREATININE: CPT

## 2023-10-26 PROCEDURE — 85730 THROMBOPLASTIN TIME PARTIAL: CPT

## 2023-10-26 PROCEDURE — 85025 COMPLETE CBC W/AUTO DIFF WBC: CPT

## 2023-10-26 RX ORDER — ACETAMINOPHEN 500 MG
3 TABLET ORAL
Qty: 0 | Refills: 0 | DISCHARGE
Start: 2023-10-26

## 2023-10-26 RX ORDER — NIFEDIPINE 30 MG
1 TABLET, EXTENDED RELEASE 24 HR ORAL
Qty: 0 | Refills: 0 | DISCHARGE
Start: 2023-10-26

## 2023-10-26 RX ORDER — IBUPROFEN 200 MG
1 TABLET ORAL
Qty: 0 | Refills: 0 | DISCHARGE
Start: 2023-10-26

## 2023-10-26 RX ADMIN — Medication 30 MILLIGRAM(S): at 01:18

## 2023-10-26 RX ADMIN — Medication 600 MILLIGRAM(S): at 06:04

## 2023-10-26 RX ADMIN — Medication 300 MILLIGRAM(S): at 05:34

## 2023-10-26 RX ADMIN — Medication 975 MILLIGRAM(S): at 01:49

## 2023-10-26 RX ADMIN — Medication 975 MILLIGRAM(S): at 10:49

## 2023-10-26 RX ADMIN — Medication 975 MILLIGRAM(S): at 01:19

## 2023-10-26 RX ADMIN — HEPARIN SODIUM 5000 UNIT(S): 5000 INJECTION INTRAVENOUS; SUBCUTANEOUS at 09:22

## 2023-10-26 RX ADMIN — Medication 600 MILLIGRAM(S): at 05:34

## 2023-10-26 RX ADMIN — Medication 975 MILLIGRAM(S): at 09:24

## 2023-10-26 NOTE — DISCHARGE NOTE OB - CARE PLAN
1 Principal Discharge DX:	Preeclampsia in postpartum period  Assessment and plan of treatment:	routine post partum care;  regular diet;  limited physical and sexual activities for 5-6 weeks;  bp meds as prescribed;  monitor bp at home twice a day and call with elevations

## 2023-10-26 NOTE — DISCHARGE NOTE OB - PATIENT PORTAL LINK FT
You can access the FollowMyHealth Patient Portal offered by Health system by registering at the following website: [email or web address]. By joining [facility]’s FollowMyHealth portal, you will also be able to view your health information using other applications (apps) compatible with our system.

## 2023-10-26 NOTE — DISCHARGE NOTE OB - MEDICATION SUMMARY - MEDICATIONS TO TAKE
I will START or STAY ON the medications listed below when I get home from the hospital:    ibuprofen 600 mg oral tablet  -- 1 tab(s) by mouth every 6 hours As needed Moderate Pain (4 - 6), Severe Pain (7 - 10)  -- Indication: For mild to moderate pain    acetaminophen 325 mg oral tablet  -- 3 tab(s) by mouth every 6 hours as needed for Mild Pain (1 - 3), Moderate Pain (4 - 6)  -- Indication: For mild to moderate pain    labetalol 300 mg oral tablet  -- 1 tab(s) by mouth 3 times a day  -- Indication: For hypertension    NIFEdipine 30 mg oral tablet, extended release  -- 1 tab(s) by mouth once a day  -- Indication: For hypertension    Prenatal Multivitamins with Folic Acid 1 mg oral tablet  -- 1 tab(s) by mouth once a day  -- Indication: For supplement

## 2023-10-26 NOTE — PROGRESS NOTE ADULT - SUBJECTIVE AND OBJECTIVE BOX
OB Progress Note: PP Readmit    S: Patient feels very fatigued. Denies HA, blurry vision, RUQ pain, SOB.     O:  Vitals:  Vital Signs Last 24 Hrs  T(C): 36.6 (26 Oct 2023 00:00), Max: 36.8 (25 Oct 2023 20:15)  T(F): 97.8 (26 Oct 2023 00:00), Max: 98.2 (25 Oct 2023 20:15)  HR: 83 (26 Oct 2023 01:15) (81 - 94)  BP: 134/78 (26 Oct 2023 01:15) (116/75 - 143/83)  BP(mean): 103 (25 Oct 2023 12:00) (103 - 103)  RR: 18 (26 Oct 2023 01:15) (18 - 18)  SpO2: 98% (26 Oct 2023 01:15) (97% - 99%)    Parameters below as of 26 Oct 2023 01:15  Patient On (Oxygen Delivery Method): room air        MEDICATIONS  (STANDING):  heparin   Injectable 5000 Unit(s) SubCutaneous every 12 hours  influenza   Vaccine 0.5 milliLiter(s) IntraMuscular once  labetalol 300 milliGRAM(s) Oral every 8 hours  lactated ringers. 1000 milliLiter(s) (50 mL/Hr) IV Continuous <Continuous>  magnesium sulfate Infusion 2 Gm/Hr (50 mL/Hr) IV Continuous <Continuous>  NIFEdipine XL 30 milliGRAM(s) Oral daily  pantoprazole    Tablet 40 milliGRAM(s) Oral before breakfast  prenatal multivitamin 1 Tablet(s) Oral daily      Labs:  Blood type: A Positive  Rubella IgG: RPR: Negative                          9.9<L>   10.29 >-----------< 184    ( 10-25 @ 00:52 )             30.6<L>    10-25-23 @ 00:52      139  |  102  |  10  ----------------------------<  106<H>  4.0   |  22  |  0.47<L>        Ca    9.0      25 Oct 2023 00:52  Mg     4.8<H>     10-25  Mg     4.7<H>     10-25  Mg     3.9<H>     10-25    TPro  6.8  /  Alb  3.5  /  TBili  0.2  /  DBili  x   /  AST  35  /  ALT  38  /  AlkPhos  95  10-25-23 @ 00:52          Physical Exam:  General: NAD  Abdomen: soft, non-tender, non-distended, fundus firm  Extremities: no calf tenderness  
OB Progress Note: PP Readmit    S: 37yo PPD#5 s/p . Patient feels well, reports feeling tired. Denies HA, blurry vision, RUQ pain, SOB.  O:  Vitals:   Vital Signs Last 24 Hrs  T(C): 36.4 (25 Oct 2023 03:10), Max: 37 (25 Oct 2023 00:02)  T(F): 97.5 (25 Oct 2023 03:10), Max: 98.6 (25 Oct 2023 00:02)  HR: 93 (25 Oct 2023 05:01) (75 - 108)  BP: 138/84 (25 Oct 2023 05:01) (119/55 - 176/80)  BP(mean): --  RR: 18 (25 Oct 2023 05:) (18 - 20)  SpO2: 96% (25 Oct 2023 05:01) (93% - 100%)    Parameters below as of 25 Oct 2023 05:01  Patient On (Oxygen Delivery Method): room air        MEDICATIONS  (STANDING):  heparin   Injectable 5000 Unit(s) SubCutaneous every 12 hours  influenza   Vaccine 0.5 milliLiter(s) IntraMuscular once  labetalol 300 milliGRAM(s) Oral every 8 hours  lactated ringers. 1000 milliLiter(s) (50 mL/Hr) IV Continuous <Continuous>  magnesium sulfate Infusion 2 Gm/Hr (50 mL/Hr) IV Continuous <Continuous>  NIFEdipine XL 30 milliGRAM(s) Oral daily  pantoprazole    Tablet 40 milliGRAM(s) Oral before breakfast  prenatal multivitamin 1 Tablet(s) Oral daily    MEDICATIONS  (PRN):  acetaminophen     Tablet .. 975 milliGRAM(s) Oral every 6 hours PRN Mild Pain (1 - 3), Moderate Pain (4 - 6)  ibuprofen  Tablet. 600 milliGRAM(s) Oral every 6 hours PRN Moderate Pain (4 - 6), Severe Pain (7 - 10)      Labs:  Blood type: A Positive  Rubella IgG: RPR: Negative                          9.9<L>   10.29 >-----------< 184    ( 10-25 @ 00:52 )             30.6<L>    10-23 @ 00:52      139  |  102  |  10  ----------------------------<  106<H>  4.0   |  22  |  0.47<L>        Ca    9.0      25 Oct 2023 00:52    TPro  6.8  /  Alb  3.5  /  TBili  0.2  /  DBili  x   /  AST  35  /  ALT  38  /  AlkPhos  95  10-25-23 @ 00:52          Physical Exam:  General: NAD  Abdomen: soft, non-tender, non-distended  Extremities: No erythema/calf tenderness

## 2023-10-26 NOTE — DISCHARGE NOTE OB - PLAN OF CARE
routine post partum care;  regular diet;  limited physical and sexual activities for 5-6 weeks;  bp meds as prescribed;  monitor bp at home twice a day and call with elevations

## 2023-10-26 NOTE — DISCHARGE NOTE OB - HOSPITAL COURSE
The patient was admitted and started on magnesium sulfate for 24 hours.  she had procardia added to her meds and bp was controlled. she is being discharged home in stable condition.

## 2023-10-26 NOTE — DISCHARGE NOTE OB - CARE PROVIDER_API CALL
Jose Raul Mittal  Obstetrics and Gynecology  [address], [address]  [place] [address]  Phone: [phone]  Fax: [phone]  Follow Up Time:

## 2023-10-26 NOTE — PROGRESS NOTE ADULT - ASSESSMENT
A/P: 39yo PPD#5 s/p , now readmitted with PP siPEC on Mg. Patient meeting criteria with severe-range BPs, s/p Procardia 10 IR x2 on admission. Patient s/p magnesium for seizure ppx and on procardia 30XL in addition to home labetalol 300 TID. Patient is stable and doing well post-partum.      #siPEC  - BPs overnight 130/70-80s  - s/p Procardia 10 IR x2   - c/w home labetalol 300 TID (of note patient took extra 600mg labetalol prior to presentation) and procardia 30xl  - HELLP wnl    #Postpartum  - Pain well controlled, continue current pain regimen  - Increase ambulation, SCDs when not ambulating  - Continue regular diet  - Discharge planning     JANE Lau PGY-3

## 2023-12-08 ENCOUNTER — APPOINTMENT (OUTPATIENT)
Dept: CARDIOLOGY | Facility: CLINIC | Age: 38
End: 2023-12-08
Payer: COMMERCIAL

## 2023-12-08 ENCOUNTER — NON-APPOINTMENT (OUTPATIENT)
Age: 38
End: 2023-12-08

## 2023-12-08 VITALS
BODY MASS INDEX: 39.24 KG/M2 | HEIGHT: 67 IN | DIASTOLIC BLOOD PRESSURE: 73 MMHG | WEIGHT: 250 LBS | SYSTOLIC BLOOD PRESSURE: 127 MMHG | OXYGEN SATURATION: 95 % | HEART RATE: 77 BPM

## 2023-12-08 DIAGNOSIS — I10 ESSENTIAL (PRIMARY) HYPERTENSION: ICD-10-CM

## 2023-12-08 PROCEDURE — 99214 OFFICE O/P EST MOD 30 MIN: CPT | Mod: 25

## 2023-12-08 PROCEDURE — 93000 ELECTROCARDIOGRAM COMPLETE: CPT

## 2024-01-13 NOTE — DISCHARGE NOTE OB - LAUNCH MEDICATION RECONCILIATION
SAFETY CHECKLIST  ID Bands and Risk clasps correct and in place (DNR, Fall risk, Allergy, Latex, Limb):  Yes  All Lines Reconciled and labeled correctly: Yes  Whiteboard updated:Yes  Environmental interventions: Yes    Verify Tele #:  RAJESH Billings RN on 1/13/2024 at 7:26 AM    <<-----Click here for Discharge Medication Review

## 2024-02-20 RX ORDER — LABETALOL HYDROCHLORIDE 300 MG/1
300 TABLET, FILM COATED ORAL
Qty: 270 | Refills: 3 | Status: ACTIVE | COMMUNITY
Start: 1900-01-01 | End: 1900-01-01

## 2024-06-13 ENCOUNTER — RX RENEWAL (OUTPATIENT)
Age: 39
End: 2024-06-13

## 2024-06-13 RX ORDER — NIFEDIPINE 30 MG/1
30 TABLET, EXTENDED RELEASE ORAL DAILY
Qty: 90 | Refills: 0 | Status: ACTIVE | COMMUNITY
Start: 2023-12-08 | End: 1900-01-01

## 2024-07-30 NOTE — OB RN PATIENT PROFILE - HISTORY OF COVID-19 VACCINATION
"Pt warm transferred by PEP.  Pt had recently attempted suicide and has been weaned off Alprazolam and was given Atarax to help with side effects from withdrawal.  Pt had s/e with Atarax and was then changed to Propanalol.  Pt states that she took her Propanalol around 7:30-8am this morning and she feels her s/s have worsened after taking the medication.      Pt c/o restlessness, insomnia, hives, fever with tmax of 101, sweats, chills, increased urination and nausea.  Pt states she is also experiencing a fluttering discomfort in her chest, which she never had with anxiety attacks before.  Pt states the sensation is right in between her breasts.  Pt is also experiencing a lot of cramping in her lower pelvic area.  Hives have also developed in the past week in various locations.  Some sites have improved while others appear to have turned in to pimple like growths.   Pt warm transferred over to office to help with scheduling as protocols states pt should be seen either today or tomorrow in office.      Reason for Disposition   Hives persist > 1 week    Answer Assessment - Initial Assessment Questions  1. APPEARANCE: \"What does the rash look like?\"       Hives    2. LOCATION: \"Where is the rash located?\"       HA, neck, chest, took benadryl.  Almost have a head    3. NUMBER: \"How many hives are there?\"       Several    5. ONSET: \"When did the hives begin?\" (Hours or days ago)       About a week ago.    6. ITCHING: \"Does it itch?\" If Yes, ask: \"How bad is the itch?\"     - MILD: doesn't interfere with normal activities    - MODERATE-SEVERE: interferes with work, school, sleep, or other activities       Moderate    7. RECURRENT PROBLEM: \"Have you had hives before?\" If Yes, ask: \"When was the last time?\" and \"What happened that time?\"       Hives last time with Atarax.  D/c'd Atarax and hives went away.    8. TRIGGERS: \"Were you exposed to any new food, plant, cosmetic product or animal just before the hives began?\"      Pt " "recently started Propanalol    9. OTHER SYMPTOMS: \"Do you have any other symptoms?\" (e.g., fever, tongue swelling, difficulty breathing, abdominal pain)      Nausea, fever Tmax 101, Insomnia, can't stop moving very restless    Protocols used: Hives-ADULT-[place]    " Yes

## 2024-10-31 ENCOUNTER — OFFICE (OUTPATIENT)
Facility: LOCATION | Age: 39
Setting detail: OPHTHALMOLOGY
End: 2024-10-31
Payer: COMMERCIAL

## 2024-10-31 DIAGNOSIS — H01.004: ICD-10-CM

## 2024-10-31 DIAGNOSIS — H01.001: ICD-10-CM

## 2024-10-31 DIAGNOSIS — H00.14: ICD-10-CM

## 2024-10-31 DIAGNOSIS — H16.223: ICD-10-CM

## 2024-10-31 PROCEDURE — 92002 INTRM OPH EXAM NEW PATIENT: CPT | Performed by: OPHTHALMOLOGY

## 2024-10-31 ASSESSMENT — CONFRONTATIONAL VISUAL FIELD TEST (CVF)
OD_FINDINGS: FULL
OS_FINDINGS: FULL

## 2024-10-31 ASSESSMENT — TONOMETRY
OS_IOP_MMHG: 15
OD_IOP_MMHG: 14

## 2024-10-31 ASSESSMENT — VISUAL ACUITY
OD_BCVA: 20/15
OS_BCVA: 20/20-2

## 2025-01-14 ENCOUNTER — NON-APPOINTMENT (OUTPATIENT)
Age: 40
End: 2025-01-14

## 2025-02-18 ENCOUNTER — RX RENEWAL (OUTPATIENT)
Age: 40
End: 2025-02-18

## 2025-05-22 ENCOUNTER — RX RENEWAL (OUTPATIENT)
Age: 40
End: 2025-05-22

## 2025-06-27 NOTE — DISCHARGE NOTE OB - NS PRO REASONS FOR NOT BREASTFEEDING
Principal Discharge DX:	Dehydration   1 The mother chose not to exclusively breastfeed upon admission.

## 2025-08-11 ENCOUNTER — APPOINTMENT (OUTPATIENT)
Dept: CARDIOLOGY | Facility: CLINIC | Age: 40
End: 2025-08-11